# Patient Record
Sex: FEMALE | Race: BLACK OR AFRICAN AMERICAN | NOT HISPANIC OR LATINO | Employment: UNEMPLOYED | URBAN - METROPOLITAN AREA
[De-identification: names, ages, dates, MRNs, and addresses within clinical notes are randomized per-mention and may not be internally consistent; named-entity substitution may affect disease eponyms.]

---

## 2017-03-14 ENCOUNTER — PATIENT MESSAGE (OUTPATIENT)
Dept: INTERNAL MEDICINE | Facility: CLINIC | Age: 42
End: 2017-03-14

## 2017-03-14 DIAGNOSIS — L81.9 DYSCHROMIA: ICD-10-CM

## 2017-03-14 DIAGNOSIS — F41.9 ANXIETY: ICD-10-CM

## 2017-03-14 DIAGNOSIS — L70.0 ACNE VULGARIS: ICD-10-CM

## 2017-03-14 DIAGNOSIS — F32.A DEPRESSION, UNSPECIFIED DEPRESSION TYPE: ICD-10-CM

## 2017-03-14 RX ORDER — METFORMIN HYDROCHLORIDE 500 MG/1
500 TABLET, EXTENDED RELEASE ORAL DAILY
Qty: 90 TABLET | Refills: 0 | Status: SHIPPED | OUTPATIENT
Start: 2017-03-14 | End: 2017-03-20 | Stop reason: SDUPTHER

## 2017-03-14 RX ORDER — BUPROPION HYDROCHLORIDE 150 MG/1
TABLET ORAL
Qty: 90 TABLET | Refills: 1 | Status: CANCELLED | OUTPATIENT
Start: 2017-03-14

## 2017-03-14 RX ORDER — SERTRALINE HYDROCHLORIDE 50 MG/1
50 TABLET, FILM COATED ORAL DAILY
Qty: 90 TABLET | Refills: 1 | Status: CANCELLED | OUTPATIENT
Start: 2017-03-14 | End: 2018-03-14

## 2017-03-14 RX ORDER — REPAGLINIDE 2 MG/1
2 TABLET ORAL
Qty: 270 TABLET | Refills: 0 | Status: SHIPPED | OUTPATIENT
Start: 2017-03-14 | End: 2017-05-08

## 2017-03-14 RX ORDER — ERGOCALCIFEROL 1.25 MG/1
50000 CAPSULE ORAL
Qty: 8 CAPSULE | Refills: 0 | Status: SHIPPED | OUTPATIENT
Start: 2017-03-16 | End: 2021-11-01

## 2017-03-14 NOTE — TELEPHONE ENCOUNTER
meds refilled but dm has been severely out of control. She cancelled dm clinic appt in feb and has not rescheduled per chart review.  If she still considers me as her primary care physician I strongly recommend that she reschedule with diabetes clinic for intensive diabetes control.

## 2017-03-15 ENCOUNTER — PATIENT MESSAGE (OUTPATIENT)
Dept: DERMATOLOGY | Facility: CLINIC | Age: 42
End: 2017-03-15

## 2017-03-20 ENCOUNTER — OFFICE VISIT (OUTPATIENT)
Dept: INTERNAL MEDICINE | Facility: CLINIC | Age: 42
End: 2017-03-20
Payer: COMMERCIAL

## 2017-03-20 ENCOUNTER — LAB VISIT (OUTPATIENT)
Dept: LAB | Facility: HOSPITAL | Age: 42
End: 2017-03-20
Attending: INTERNAL MEDICINE
Payer: COMMERCIAL

## 2017-03-20 VITALS
WEIGHT: 219.56 LBS | HEART RATE: 89 BPM | BODY MASS INDEX: 33.28 KG/M2 | OXYGEN SATURATION: 98 % | DIASTOLIC BLOOD PRESSURE: 86 MMHG | SYSTOLIC BLOOD PRESSURE: 118 MMHG | HEIGHT: 68 IN | TEMPERATURE: 98 F

## 2017-03-20 DIAGNOSIS — E66.9 OBESITY, UNSPECIFIED OBESITY SEVERITY, UNSPECIFIED OBESITY TYPE: ICD-10-CM

## 2017-03-20 DIAGNOSIS — F41.8 DEPRESSION WITH ANXIETY: ICD-10-CM

## 2017-03-20 DIAGNOSIS — K58.0 IRRITABLE BOWEL SYNDROME WITH DIARRHEA: Chronic | ICD-10-CM

## 2017-03-20 DIAGNOSIS — E55.9 VITAMIN D DEFICIENCY: ICD-10-CM

## 2017-03-20 DIAGNOSIS — F41.9 ANXIETY: ICD-10-CM

## 2017-03-20 LAB
CHOLEST/HDLC SERPL: 4.4 {RATIO}
HDL/CHOLESTEROL RATIO: 22.6 %
HDLC SERPL-MCNC: 186 MG/DL
HDLC SERPL-MCNC: 42 MG/DL
LDLC SERPL CALC-MCNC: 107.2 MG/DL
NONHDLC SERPL-MCNC: 144 MG/DL
TRIGL SERPL-MCNC: 184 MG/DL

## 2017-03-20 PROCEDURE — 99214 OFFICE O/P EST MOD 30 MIN: CPT | Mod: S$GLB,,, | Performed by: INTERNAL MEDICINE

## 2017-03-20 PROCEDURE — 36415 COLL VENOUS BLD VENIPUNCTURE: CPT | Mod: PO

## 2017-03-20 PROCEDURE — 83036 HEMOGLOBIN GLYCOSYLATED A1C: CPT

## 2017-03-20 PROCEDURE — 80061 LIPID PANEL: CPT

## 2017-03-20 PROCEDURE — 99999 PR PBB SHADOW E&M-EST. PATIENT-LVL IV: CPT | Mod: PBBFAC,,, | Performed by: INTERNAL MEDICINE

## 2017-03-20 PROCEDURE — 3060F POS MICROALBUMINURIA REV: CPT | Mod: S$GLB,,, | Performed by: INTERNAL MEDICINE

## 2017-03-20 PROCEDURE — 2022F DILAT RTA XM EVC RTNOPTHY: CPT | Mod: S$GLB,,, | Performed by: INTERNAL MEDICINE

## 2017-03-20 PROCEDURE — 3046F HEMOGLOBIN A1C LEVEL >9.0%: CPT | Mod: S$GLB,,, | Performed by: INTERNAL MEDICINE

## 2017-03-20 PROCEDURE — 1160F RVW MEDS BY RX/DR IN RCRD: CPT | Mod: S$GLB,,, | Performed by: INTERNAL MEDICINE

## 2017-03-20 RX ORDER — METFORMIN HYDROCHLORIDE 500 MG/1
500 TABLET, EXTENDED RELEASE ORAL 2 TIMES DAILY WITH MEALS
Qty: 90 TABLET | Refills: 0 | Status: SHIPPED | OUTPATIENT
Start: 2017-03-20 | End: 2017-05-10 | Stop reason: SDUPTHER

## 2017-03-20 RX ORDER — SERTRALINE HYDROCHLORIDE 50 MG/1
50 TABLET, FILM COATED ORAL DAILY
Qty: 90 TABLET | Refills: 1 | Status: SHIPPED | OUTPATIENT
Start: 2017-03-20 | End: 2018-02-27

## 2017-03-20 RX ORDER — BUPROPION HYDROCHLORIDE 150 MG/1
TABLET ORAL
Qty: 90 TABLET | Refills: 1 | Status: SHIPPED | OUTPATIENT
Start: 2017-03-20 | End: 2018-02-27

## 2017-03-20 RX ORDER — CHLORDIAZEPOXIDE HYDROCHLORIDE AND CLIDINIUM BROMIDE 5; 2.5 MG/1; MG/1
1 CAPSULE ORAL
Qty: 90 CAPSULE | Refills: 0 | Status: SHIPPED | OUTPATIENT
Start: 2017-03-20 | End: 2017-10-11 | Stop reason: SDUPTHER

## 2017-03-20 NOTE — PROGRESS NOTES
"Subjective:      Patient ID: Che Shay is a 41 y.o. female.    Chief Complaint: Follow-up    HPI Comments: 42 yo with Patient Active Problem List:     GERD (gastroesophageal reflux disease)     Depression with anxiety     Obesity     IBS (irritable bowel syndrome)     Vitamin D deficiency     Acne vulgaris     Onychomycosis of toenail     Uncontrolled diabetes mellitus    Here today for management of multiple medical problems present for years.  She reports intermittent compliance with her diet exercise and diabetes medications.  She has not been compliant with vitamin D supplementation.  She is a long history of irritable bowel syndrome for which Librax have improved has improved symptoms.  She requests refill for this today.  She reports that her depression and anxiety are doing well on her current regimen.    Review of Systems   Constitutional: Negative for chills and fever.   Respiratory: Negative for cough.    Cardiovascular: Negative for chest pain.   Gastrointestinal: Negative for abdominal pain.   Psychiatric/Behavioral: Negative for dysphoric mood, hallucinations and suicidal ideas. The patient is not nervous/anxious.      Objective:   /86 (BP Location: Right arm, Patient Position: Sitting)  Pulse 89  Temp 97.6 °F (36.4 °C) (Tympanic)   Ht 5' 8" (1.727 m)  Wt 99.6 kg (219 lb 9.3 oz)  LMP 03/15/2017 (Approximate)  SpO2 98%  BMI 33.39 kg/m2    Physical Exam   Constitutional: She is oriented to person, place, and time. She appears well-developed and well-nourished. No distress.   HENT:   Head: Normocephalic and atraumatic.   Mouth/Throat: Oropharynx is clear and moist.   Eyes: EOM are normal.   Neck: Neck supple.   Cardiovascular: Normal rate and regular rhythm.    Pulmonary/Chest: Breath sounds normal. She has no wheezes. She has no rales.   Neurological: She is alert and oriented to person, place, and time.   Skin: Skin is warm and dry.   Psychiatric: She has a normal mood and affect. Her " behavior is normal.       Assessment:     1. Uncontrolled type 2 diabetes mellitus without complication, with long-term current use of insulin    2. Vitamin D deficiency    3. Depression with anxiety    4. Anxiety    5. Obesity, unspecified obesity severity, unspecified obesity type    6. Irritable bowel syndrome with diarrhea      Plan:   Uncontrolled type 2 diabetes mellitus without complication, with long-term current use of insulin  -     metformin (GLUCOPHAGE-XR) 500 MG 24 hr tablet; Take 1 tablet (500 mg total) by mouth 2 (two) times daily with meals.  Dispense: 90 tablet; Refill: 0  -     Hemoglobin A1c; Future; Expected date: 3/20/17  -     Ambulatory referral to Optometry  -     Microalbumin/creatinine urine ratio; Future; Expected date: 3/20/17  -     Lipid panel; Future; Expected date: 3/20/17  -     Ambulatory Referral to Diabetes Education    Vitamin D deficiency  -     Vitamin D; Future; Expected date: 6/20/17    Depression with anxiety  Stable  Continue current medications    Anxiety  Stable continue current medications  -     buPROPion (WELLBUTRIN XL) 150 MG TB24 tablet; One tablet daily.  Dispense: 90 tablet; Refill: 1  -     sertraline (ZOLOFT) 50 MG tablet; Take 1 tablet (50 mg total) by mouth once daily.  Dispense: 90 tablet; Refill: 1    Obesity, unspecified obesity severity, unspecified obesity type  Diet and exercise discussed    Irritable bowel syndrome with diarrhea  -     chlordiazepoxide-clidinium 5-2.5 mg (LIBRAX) 5-2.5 mg Cap; Take 1 capsule by mouth 3 (three) times daily with meals. As needed  Dispense: 90 capsule; Refill: 0        Lab Frequency Next Occurrence   Mammo Digital Screening Bilat with CAD Once 8/8/2016   Hemoglobin A1c Once 4/7/2017   Hemoglobin A1c Once 3/20/2017   Vitamin D Once 6/20/2017   Microalbumin/creatinine urine ratio Once 3/20/2017   Lipid panel Once 3/20/2017         Return in about 3 months (around 6/20/2017), or if symptoms worsen or fail to improve.

## 2017-03-20 NOTE — MR AVS SNAPSHOT
[facility]  [address] [facility] [place] [address]  Phone: [phone]  Fax: [phone]                  Che Shay   3/20/2017 10:20 AM   Office Visit    Description:  Female : 1975   Provider:  Luis Alfredo Mckinney MD   Department:  [facility] - [facility]           Reason for Visit     Follow-up           Diagnoses this Visit        Comments    Uncontrolled type 2 diabetes mellitus without complication, with long-term current use of insulin    -  Primary     Vitamin D deficiency         Depression with anxiety         Anxiety         Obesity, unspecified obesity severity, unspecified obesity type         Onychomycosis of toenail         Irritable bowel syndrome with diarrhea                To Do List           Future Appointments        Provider Department Dept Phone    3/20/2017 12:15 PM LAB, SAME DAY [facility] [phone]    3/20/2017 12:40 PM SPECIMEN, [facility] [phone]    2017 7:30 AM Power Peraza Jr., LJ [facility] [phone]    2017 9:00 AM LABORATORY, [facility] [phone]    2017 11:20 AM Luis Alfredo Mckinney MD [facility] [phone]      Goals (5 Years of Data)     None      Follow-Up and Disposition     Return in about 3 months (around 2017), or if symptoms worsen or fail to improve.       These Medications        Disp Refills Start End    metformin (GLUCOPHAGE-XR) 500 MG 24 hr tablet 90 tablet 0 3/20/2017     Take 1 tablet (500 mg total) by mouth 2 (two) times daily with meals. - Oral    Pharmacy: [address] - [address] [facility] #: [phone]       buPROPion (WELLBUTRIN XL) 150 MG TB24 tablet 90 tablet 1 3/20/2017     One tablet daily.    Pharmacy: [address] [address] [facility] Ph #: [phone]       Notes to Pharmacy: Advise pt  last refill; needs appt w/PCP for future refills.    sertraline (ZOLOFT) 50 MG tablet 90 tablet 1 3/20/2017 3/20/2018    Take 1 tablet (50 mg total) by mouth once daily. - Oral    [facility] [address] [address] [facility] [phone]       Notes to Pharmacy: Advise pt last refill; needs appt w/PCP for future refills.    chlordiazepoxide-clidinium 5-2.5 mg (LIBRAX) 5-2.5 mg Cap 90 capsule 0 3/20/2017     Take 1 capsule by mouth 3 (three) times daily with meals. As needed - Oral    Pharmacy: [address] [address] [facility] Ph #: [phone]         [facility]     [facility] On Call Nurse Care Line - 24/7 Assistance  Registered nurses in the [facility] On Call Center provide clinical advisement, health education, appointment booking, and other advisory services.  Call for this free service at [phone].             Medications           CHANGE how you are taking these medications     Start Taking Instead of    metformin (GLUCOPHAGE-XR) 500 MG 24 hr tablet metformin (GLUCOPHAGE-XR) 500 MG 24 hr tablet    Dosage:  Take 1 tablet (500 mg total) by mouth 2 (two) times daily with meals. Dosage:  Take 1 tablet (500 mg total) by mouth once daily.    Reason for Change:  Reorder     chlordiazepoxide-clidinium 5-2.5 mg (LIBRAX) 5-2.5 mg Cap chlordiazepoxide-clidinium 5-2.5 mg (LIBRAX) 5-2.5 mg Cap  (Previously Discontinued)    Dosage:  Take 1 capsule by mouth 3 (three) times daily with meals. As needed Dosage:  TAKE ONE CAPSULE BY MOUTH THREE TIMES DAILY WITH MEALS,  LAST REFILL NEED TO MAKE APPOINTMENT WITH PRIMARY CARE PHYSICIAN    Reason for Change:  Patient no longer taking       STOP taking these medications     efinaconazole 10 % Susan Apply topically to fungal toenails daily    insulin aspart (NOVOLOG FLEXPEN) 100 unit/mL InPn pen Inject 8-16 Units into the skin 3 (three) times daily with meals. or as directed           Verify that the  "below list of medications is an accurate representation of the medications you are currently taking.  If none reported, the list may be blank. If incorrect, please contact your healthcare provider. Carry this list with you in case of emergency.           Current Medications     buPROPion (WELLBUTRIN XL) 150 MG TB24 tablet One tablet daily.    EPIDUO 0.1-2.5 % topical gel AAA face qhs    insulin glargine (LANTUS) 100 unit/mL injection Inject 40 Units into the skin every evening.    insulin syringe-needle U-100 0.3 mL 31 x 5/16" Syrg     metformin (GLUCOPHAGE-XR) 500 MG 24 hr tablet Take 1 tablet (500 mg total) by mouth 2 (two) times daily with meals.    pen needle, diabetic (BD INSULIN PEN NEEDLE UF SHORT) 31 gauge x 5/16" Ndle 1 Device by Misc.(Non-Drug; Combo Route) route once daily.    repaglinide (PRANDIN) 2 MG tablet Take 1 tablet (2 mg total) by mouth 3 (three) times daily before meals.    sertraline (ZOLOFT) 50 MG tablet Take 1 tablet (50 mg total) by mouth once daily.    triamcinolone acetonide 0.1% (KENALOG) 0.1 % cream Apply to affected areas twice a day as needed    blood sugar diagnostic (CONTOUR TEST STRIPS) Strp 1 strip by Misc.(Non-Drug; Combo Route) route 4 (four) times daily with meals and nightly.    chlordiazepoxide-clidinium 5-2.5 mg (LIBRAX) 5-2.5 mg Cap Take 1 capsule by mouth 3 (three) times daily with meals. As needed    ergocalciferol (ERGOCALCIFEROL) 50,000 unit Cap Take 1 capsule (50,000 Units total) by mouth twice a week.           Clinical Reference Information           Your Vitals Were     BP Pulse Temp Height Weight Last Period    118/86 (BP Location: Right arm, Patient Position: Sitting) 89 97.6 °F (36.4 °C) (Tympanic) 5' 8" (1.727 m) 99.6 kg (219 lb 9.3 oz) 03/15/2017 (Approximate)    SpO2 BMI             98% 33.39 kg/m2         Blood Pressure          Most Recent Value    BP  118/86      Allergies as of 3/20/2017     No Known Allergies      Immunizations Administered on Date of " Encounter - 3/20/2017     None      Orders Placed During Today's Visit      Normal Orders This Visit    Ambulatory Referral to Diabetes Education     Ambulatory referral to Optometry     Future Labs/Procedures Expected by Expires    Hemoglobin A1c  3/20/2017 3/20/2018    Lipid panel  3/20/2017 5/19/2018    Microalbumin/creatinine urine ratio  3/20/2017 (Approximate) 4/24/2018    Vitamin D  6/20/2017 (Approximate) 9/17/2017      Language Assistance Services     ATTENTION: Language assistance services are available, free of charge. Please call [phone].      ATENCIÓN: Si habla keena, tiene a méndez disposición servicios gratuitos de asistencia lingüística. Llame al [phone].     CHÚ Ý: N?u b?n nói Ti?ng Vi?t, có các d?ch v? h? tr? ngôn ng? mi?n phí dành cho b?n. G?i s? [phone].         [facility] complies with applicable Federal civil rights laws and does not discriminate on the basis of race, color, national origin, age, disability, or sex.

## 2017-03-21 LAB
ESTIMATED AVG GLUCOSE: 349 MG/DL
HBA1C MFR BLD HPLC: 13.8 %

## 2017-03-31 DIAGNOSIS — L70.0 ACNE VULGARIS: ICD-10-CM

## 2017-04-03 RX ORDER — CLINDAMYCIN PHOSPHATE AND BENZOYL PEROXIDE 10; 37.5 MG/G; MG/G
GEL TOPICAL
Qty: 50 G | Refills: 4 | Status: SHIPPED | OUTPATIENT
Start: 2017-04-03 | End: 2018-02-27

## 2017-04-25 ENCOUNTER — TELEPHONE (OUTPATIENT)
Dept: INTERNAL MEDICINE | Facility: CLINIC | Age: 42
End: 2017-04-25

## 2017-04-25 DIAGNOSIS — Z01.419 WELL WOMAN EXAM: Primary | ICD-10-CM

## 2017-04-25 NOTE — TELEPHONE ENCOUNTER
Pt stated she needs a gyn referral to Dr. Debora Rodriguez for a well woman exam.  Notified pt that request will be sent to doctor for approval.  Pt verbalized understanding.

## 2017-04-25 NOTE — TELEPHONE ENCOUNTER
----- Message from Hardeep Pelletier sent at 4/25/2017 11:19 AM CDT -----  Contact: pt  She's calling in regards to a referral to see dr memo mabry in ob/gyn, please advise, [phone] (home), please call pt when referral has been entered....

## 2017-05-08 ENCOUNTER — OFFICE VISIT (OUTPATIENT)
Dept: DIABETES | Facility: CLINIC | Age: 42
End: 2017-05-08
Payer: COMMERCIAL

## 2017-05-08 VITALS
WEIGHT: 214.75 LBS | DIASTOLIC BLOOD PRESSURE: 80 MMHG | BODY MASS INDEX: 32.55 KG/M2 | SYSTOLIC BLOOD PRESSURE: 120 MMHG | HEIGHT: 68 IN

## 2017-05-08 LAB — GLUCOSE SERPL-MCNC: 456 MG/DL (ref 70–110)

## 2017-05-08 PROCEDURE — 99214 OFFICE O/P EST MOD 30 MIN: CPT | Mod: S$GLB,,, | Performed by: PHYSICIAN ASSISTANT

## 2017-05-08 PROCEDURE — 99999 PR PBB SHADOW E&M-EST. PATIENT-LVL III: CPT | Mod: PBBFAC,,, | Performed by: PHYSICIAN ASSISTANT

## 2017-05-08 PROCEDURE — 1160F RVW MEDS BY RX/DR IN RCRD: CPT | Mod: S$GLB,,, | Performed by: PHYSICIAN ASSISTANT

## 2017-05-08 PROCEDURE — 3060F POS MICROALBUMINURIA REV: CPT | Mod: 8P,S$GLB,, | Performed by: PHYSICIAN ASSISTANT

## 2017-05-08 PROCEDURE — 3046F HEMOGLOBIN A1C LEVEL >9.0%: CPT | Mod: S$GLB,,, | Performed by: PHYSICIAN ASSISTANT

## 2017-05-08 PROCEDURE — 82948 REAGENT STRIP/BLOOD GLUCOSE: CPT | Mod: S$GLB,,, | Performed by: PHYSICIAN ASSISTANT

## 2017-05-08 RX ORDER — INSULIN ASPART 100 [IU]/ML
INJECTION, SOLUTION INTRAVENOUS; SUBCUTANEOUS
Qty: 54 ML | Refills: 3 | Status: SHIPPED | OUTPATIENT
Start: 2017-05-08 | End: 2021-11-01

## 2017-05-08 RX ORDER — VITAMIN E 268 MG
400 CAPSULE ORAL DAILY
COMMUNITY
End: 2021-11-01

## 2017-05-08 RX ORDER — ASCORBIC ACID 500 MG
500 TABLET ORAL DAILY
COMMUNITY
End: 2021-11-01

## 2017-05-08 RX ORDER — METFORMIN HYDROCHLORIDE 500 MG/1
TABLET, EXTENDED RELEASE ORAL
Qty: 90 TABLET | Refills: 0 | Status: SHIPPED | OUTPATIENT
Start: 2017-05-08 | End: 2017-07-10

## 2017-05-08 RX ORDER — MAGNESIUM 250 MG
1 TABLET ORAL
COMMUNITY
End: 2021-11-01

## 2017-05-08 NOTE — PROGRESS NOTES
Subjective:      Patient ID: Che Shay is a 41 y.o. female.    PCP: Luis Alfredo Mckinney MD      Che Shay is a pleasant 41 y.o. female presenting to follow up on diabetes mellitus. She has had diabetes for 10 or more years. Her last visit in Diabetes Management was 11/21/2016 Since that time she has had no improvement in her glycemia. Her blood sugar range fasting has been 200+ and 2 hour post meal has been 300+, and she has been monitoring 2-3 times per day as directed. Her current concerns are glycemic control.    She denies any hospital admissions, emergency room visits, hypoglycemia, syncope, diaphoresis, chest pain, or dyspnea.    She has lost 5 pounds since last visit. Her BMI is 32.65    Her blood sugar in the clinic today was:   Lab Results   Component Value Date    POCGLU 456 (A) 05/08/2017       We discussed the American diabetes Association recommendations:  hemoglobin A1c below 7.0%; all diabetics should be on statins unless contraindicated; one aspirin daily unless contraindicated; fasting blood sugar between 80 and 130 mg/dL; postprandial blood sugar below 180 mg/dl; prevention of hypoglycemia, may adjust goals to higher levels if persistent; ACE or ARB therapy if not contraindicated; and maintain in an ideal body weight with BMI below 25.    Che is compliant most of the time with DM medications.     Che is compliant most of the time with lifestyle modifications to include activity and meal planning.       STANDARDS OF CARE:  Eye doctor: unk, last exam unk.  Dental exam: Recommend regular exams; denies gums bleeding.  Podiatry doctor:     ACTIVITY LEVEL: She exercises rarely.  MEAL PLANNING: Number of meals per day - 3. Number of snacks per day - 2.  Per dietary recall, patient is not limiting carbohydrates, saturated fats and sodium.   BLOOD GLUCOSE TESTING: Self-monitoring with     The following results were reviewed with patient.    Lab Results   Component Value Date    WBC 8.30  03/29/2016    HGB 12.7 03/29/2016    HCT 39.5 03/29/2016     03/29/2016    CHOL 186 03/20/2017    TRIG 184 (H) 03/20/2017    HDL 42 03/20/2017    ALT 11 04/11/2016    AST 16 04/11/2016     03/29/2016    K 3.7 03/29/2016     03/29/2016    CREATININE 0.9 03/29/2016    ESTGFRAFRICA >60.0 03/29/2016    EGFRNONAA >60.0 03/29/2016    BUN 11 03/29/2016    CO2 24 03/29/2016    TSH 2.326 03/29/2016     (H) 03/29/2016       Lab Results   Component Value Date    HGBA1C 13.8 (H) 03/20/2017    HGBA1C 13.3 (H) 11/01/2016    HGBA1C 11.3 (H) 03/29/2016       Lab Results   Component Value Date    GLUTAMICACID 0.00 11/21/2016    CPEPTIDE 2.0 11/21/2016     Lab Results   Component Value Date    FREET4 1.35 10/21/2013     Lab Results   Component Value Date    TSH 2.326 03/29/2016     Lab Results   Component Value Date    CALCIUM 9.3 03/29/2016           Review of patient's allergies indicates:  No Known Allergies    Past Medical History:   Diagnosis Date    Depression with anxiety     GERD (gastroesophageal reflux disease)     IBS (irritable bowel syndrome)     Type II or unspecified type diabetes mellitus without mention of complication, uncontrolled     Vitamin D deficiency        Review of Systems   Constitutional: Negative.  Negative for activity change, appetite change, chills, diaphoresis, fatigue, fever and unexpected weight change.   HENT: Negative.  Negative for congestion, dental problem, drooling, ear discharge, ear pain, facial swelling, hearing loss, mouth sores, nosebleeds, postnasal drip, rhinorrhea, sinus pressure, sneezing, sore throat, tinnitus, trouble swallowing and voice change.    Eyes: Negative.  Negative for photophobia, pain, discharge, redness, itching and visual disturbance.   Respiratory: Negative.  Negative for apnea, cough, choking, chest tightness, shortness of breath, wheezing and stridor.    Cardiovascular: Negative.  Negative for chest pain, palpitations and leg swelling.  "  Gastrointestinal: Negative.  Negative for abdominal distention, abdominal pain, anal bleeding, blood in stool, constipation, diarrhea, nausea, rectal pain and vomiting.   Endocrine: Negative.  Negative for cold intolerance, heat intolerance, polydipsia, polyphagia and polyuria.   Genitourinary: Negative.  Negative for decreased urine volume, difficulty urinating, dyspareunia, dysuria, enuresis, flank pain, frequency, genital sores, hematuria, menstrual problem, pelvic pain, urgency, vaginal bleeding, vaginal discharge and vaginal pain.   Musculoskeletal: Negative.  Negative for arthralgias, back pain, gait problem, joint swelling, myalgias, neck pain and neck stiffness.   Skin: Negative.  Negative for color change, pallor, rash and wound.   Allergic/Immunologic: Negative.  Negative for environmental allergies, food allergies and immunocompromised state.   Neurological: Negative.  Negative for dizziness, tremors, seizures, syncope, facial asymmetry, speech difficulty, weakness, light-headedness, numbness and headaches.   Hematological: Negative.  Negative for adenopathy. Does not bruise/bleed easily.   Psychiatric/Behavioral: Negative.  Negative for agitation, behavioral problems, confusion, decreased concentration, dysphoric mood, hallucinations, self-injury, sleep disturbance and suicidal ideas. The patient is not nervous/anxious and is not hyperactive.       Objective:     Vitals - 1 value per visit 11/21/2016 3/20/2017 5/8/2017   SYSTOLIC 140 118 120   DIASTOLIC 82 86 80   PULSE - 89 -   TEMPERATURE - 97.6 -   SPO2 - 98 -   Weight (lb) 224.43 219.58 214.73   Weight (kg) 101.8 99.6 97.4   HEIGHT 5' 8" 5' 8" 5' 8"   BODY MASS INDEX 34.12 33.39 32.65   VISIT REPORT - - -   Pain Score  - 0 -       Physical Exam   Constitutional: She is oriented to person, place, and time. She appears well-developed and well-nourished. She is cooperative.  Non-toxic appearance. She does not have a sickly appearance. She does not " appear ill. No distress. She is not intubated.   HENT:   Head: Normocephalic and atraumatic. Not macrocephalic and not microcephalic. Head is without raccoon's eyes, without Valle's sign, without abrasion, without contusion, without laceration, without right periorbital erythema and without left periorbital erythema. Hair is normal.   Right Ear: Hearing, tympanic membrane, external ear and ear canal normal. No lacerations. No drainage, swelling or tenderness. No foreign bodies. No mastoid tenderness. Tympanic membrane is not injected, not scarred, not perforated, not erythematous, not retracted and not bulging. Tympanic membrane mobility is normal. No middle ear effusion. No hemotympanum. No decreased hearing is noted.   Left Ear: Hearing, tympanic membrane, external ear and ear canal normal. No lacerations. No drainage, swelling or tenderness. No foreign bodies. No mastoid tenderness. Tympanic membrane is not injected, not scarred, not perforated, not erythematous, not retracted and not bulging. Tympanic membrane mobility is normal.  No middle ear effusion. No hemotympanum. No decreased hearing is noted.   Nose: Nose normal. No mucosal edema, rhinorrhea, nose lacerations, sinus tenderness, nasal deformity, septal deviation or nasal septal hematoma. No epistaxis.  No foreign bodies. Right sinus exhibits no maxillary sinus tenderness and no frontal sinus tenderness. Left sinus exhibits no maxillary sinus tenderness and no frontal sinus tenderness.   Mouth/Throat: Oropharynx is clear and moist. No oropharyngeal exudate.   Eyes: Conjunctivae and EOM are normal. Pupils are equal, round, and reactive to light. Right eye exhibits no chemosis, no discharge and no exudate. No foreign body present in the right eye. Left eye exhibits no chemosis, no discharge, no exudate and no hordeolum. No foreign body present in the left eye. Right conjunctiva is not injected. Right conjunctiva has no hemorrhage. Left conjunctiva is not  injected. Left conjunctiva has no hemorrhage. No scleral icterus. Right eye exhibits normal extraocular motion and no nystagmus. Left eye exhibits normal extraocular motion and no nystagmus. Right pupil is round and reactive. Left pupil is round and reactive. Pupils are equal.   Fundoscopic exam:       The right eye shows no arteriolar narrowing, no AV nicking, no exudate, no hemorrhage and no papilledema. The right eye shows red reflex and venous pulsations.        The left eye shows no arteriolar narrowing, no AV nicking, no exudate, no hemorrhage and no papilledema. The left eye shows red reflex and venous pulsations.   Neck: Trachea normal, normal range of motion and full passive range of motion without pain. Neck supple. Normal carotid pulses, no hepatojugular reflux and no JVD present. No tracheal tenderness, no spinous process tenderness and no muscular tenderness present. Carotid bruit is not present. No rigidity. No tracheal deviation, no edema, no erythema and normal range of motion present. No thyroid mass and no thyromegaly present.   Cardiovascular: Normal rate, regular rhythm, normal heart sounds and intact distal pulses.   No extrasystoles are present. PMI is not displaced.  Exam reveals no gallop, no friction rub and no decreased pulses.    No murmur heard.  Pulses:       Dorsalis pedis pulses are 2+ on the right side, and 2+ on the left side.        Posterior tibial pulses are 2+ on the right side, and 2+ on the left side.   Pulmonary/Chest: Effort normal and breath sounds normal. No accessory muscle usage or stridor. No apnea, no tachypnea and no bradypnea. She is not intubated. No respiratory distress. She has no decreased breath sounds. She has no wheezes. She has no rhonchi. She has no rales. Chest wall is not dull to percussion. She exhibits no mass, no tenderness, no bony tenderness, no laceration, no crepitus, no edema, no deformity, no swelling and no retraction.   Abdominal: Soft. Normal  appearance and bowel sounds are normal. She exhibits no shifting dullness, no distension, no pulsatile liver, no fluid wave, no abdominal bruit, no ascites, no pulsatile midline mass and no mass. There is no hepatosplenomegaly, splenomegaly or hepatomegaly. There is no tenderness. There is no rigidity, no rebound, no guarding, no CVA tenderness, no tenderness at McBurney's point and negative Cagle's sign.   Musculoskeletal: Normal range of motion. She exhibits no edema or tenderness.        Right foot: There is normal range of motion and no deformity.        Left foot: There is normal range of motion and no deformity.   Feet:   Right Foot:   Protective Sensation: 5 sites tested. 5 sites sensed.   Skin Integrity: Negative for ulcer, blister, skin breakdown, erythema, warmth, callus or dry skin.   Left Foot:   Protective Sensation: 5 sites tested. 5 sites sensed.   Skin Integrity: Negative for ulcer, blister, skin breakdown, erythema, warmth, callus or dry skin.   Lymphadenopathy:        Head (right side): No submental, no submandibular, no tonsillar, no preauricular, no posterior auricular and no occipital adenopathy present.        Head (left side): No submental, no submandibular, no tonsillar, no preauricular, no posterior auricular and no occipital adenopathy present.     She has no cervical adenopathy.        Right cervical: No superficial cervical, no deep cervical and no posterior cervical adenopathy present.       Left cervical: No superficial cervical, no deep cervical and no posterior cervical adenopathy present.     She has no axillary adenopathy.   Neurological: She is alert and oriented to person, place, and time. She has normal reflexes. She is not disoriented. She displays no atrophy, no tremor and normal reflexes. No cranial nerve deficit or sensory deficit. She exhibits normal muscle tone. She displays no seizure activity. Coordination and gait normal.   Reflex Scores:       Bicep reflexes are 2+ on  the right side and 2+ on the left side.       Brachioradialis reflexes are 2+ on the right side and 2+ on the left side.       Patellar reflexes are 2+ on the right side and 2+ on the left side.  Skin: Skin is warm and dry. No abrasion, no bruising, no burn, no ecchymosis, no laceration, no lesion, no petechiae, no purpura and no rash noted. Rash is not macular, not papular, not maculopapular, not nodular, not pustular, not vesicular and not urticarial. She is not diaphoretic. No cyanosis or erythema. No pallor. Nails show no clubbing.   Psychiatric: She has a normal mood and affect. Her behavior is normal. Judgment and thought content normal. Her mood appears not anxious. Her affect is not angry, not blunt and not labile. Her speech is not rapid and/or pressured, not delayed, not tangential and not slurred. She is not agitated, not aggressive, not hyperactive, not slowed, not withdrawn, not actively hallucinating and not combative. Thought content is not paranoid and not delusional. Cognition and memory are not impaired. She does not express impulsivity or inappropriate judgment. She does not exhibit a depressed mood. She expresses no homicidal and no suicidal ideation. She expresses no suicidal plans and no homicidal plans. She is communicative. She exhibits normal recent memory and normal remote memory. She is attentive.   Nursing note and vitals reviewed.    Assessment:     1. Uncontrolled type 2 diabetes mellitus without complication, with long-term current use of insulin       Plan:     Che Shay is seen today for   1. Uncontrolled type 2 diabetes mellitus without complication, with long-term current use of insulin      We have discussed the etiology and treatment options associated with the diagnosis as well as alternatives. She has elected the following treatments.     Uncontrolled type 2 diabetes mellitus without complication, with long-term current use of insulin  -     POCT glucose  - Have made  "changes to patient's insulin regimen, will start treating to target and have patient titrate her dose of insulin to the desired goal.  -     insulin aspart (NOVOLOG) 100 unit/mL InPn pen; Titrate up to 20 units subcutaneously three times a day 10-15 min before meals as directed in after visit summary.  Dispense: 54 mL; Refill: 3      1.) Patient was instructed to monitor blood glucose twice daily, fasting, and 2 hour post meal; if on Multiple Daily Injections (MDI) she will need to have pre-meal blood glucose as well. Reminded to bring BG meter or record to each visit for review.  2.) Reviewed pathophysiology of type 2 diabetes, complications related to the disease, importance of annual dilated eye exam and self daily foot examination.  3.) Continue medications as prescribed MDI Lantus and Novolog. [facility] MyChart or Phone review in 1 week with BG records for adjustment of medication.  4.) Reviewed carb counting, portion control, importance of spacing meals throughout the day to prevent post prandial elevations. Recommended low saturated fat, low sodium diet to aid in control of hypertension and cholesterol.  5.) Discussed activity, benefits, methods, and precautions. Recommended patient start/continue some form of exercise and increase as tolerated to 60 minutes per day to facilitate weight loss and aid in control of BGs. Also reminded patient of WHO recommendation of 10,000 steps daily as a goal.   6.) A1C, TSH, Lipid Panel, CMP with eGFR and Micro/Creatinine per ADA protocol.  7.) Return to clinic in 6 weeks for follow up. Advised patient to call clinic with any questions or concerns.     I have reviewed your results and they are still quite high. I would like you to start "Treating to Target". The treatment will be Insulin and your target will be the Fasting and 2 hour post meal blood sugar. It will work in this manner;    1. Goal for Fasting blood sugar is  mg/dl. I realize that you will need time to " "adjust to the new levels and presently you may feel too low if you are too aggressive now. So go slow and aim to lower your blood sugar to below 200 then 150 then 100 over several months.    2. Goal for 2 hour post meal blood sugar is below 180 mg/dl, here the same rules apply as in #1.    3. You will check your fasting blood sugar daily, if not where we would like it to be over a 3 day period then that evening we will increase the Lantus dose by 5 units. Then repeat the process over the next 3 days. Remember this is a slow process and take our time getting to goal. But, each week should be better than prior weeks. Blood sugars below 70 are unacceptable and should raise a "RED FLAG" where we may have to reduce our dose of insulin.    4. You will check your post meal glucose daily as well. However, each day you will check a different meal, (ie. Monday-breakfast; Tuesday- lunch; Wednesday- supper, then repeat). If your post meal glucose is not where we would like, increase pre-meal insulin by 2 units next time. A word of CAUTION: mealtime insulin is dependant on the size and concentration of your meal content. If not consuming a large meal do not take large dose of insulin. Use the reasonable person rule.     5. If you have any questions please do not hesitate to call.    Intensive insulin Therapy with correction factor:    You are on Intensive insulin therapy with Basal and Bolus insulin. Lantus, Levamir or NPH is your Basal insulin and will help maintain your fasting and between meal sugar. Your fast acting or rescue insulin is either Humalog, Novalog or Regular insulin and will control your post meal sugar.     You will Take 45 units of Lantus at 9 pm each night. This will be adjusted up or down depending on your fasting blood sugar before breakfast.    Novolog will follow this pre meal schedule; Correction factor of "2 units per 50 mg/dl" and is based on 30-60 grams of carbohydrates per meal.    If blood sugar is " below 70 eat first then check your blood sugar 2 hours later and make correction.  If blood pre-meal sugar is  70 -150 take 10 units of Novolog;  If blood pre-meal sugar is 151-200 take +2 units of Novolog;  If blood pre-meal sugar is 201-250 take +4 units of Novolog;  If blood pre-meal sugar is 251-300 take +6 units of Novolog;  If blood pre-meal sugar is 301-350 take +8 units of Novolog;  If blood pre-meal sugar is 351-400+ take +10 units of Novolog;  Also increase water intake and call for appointment.      A total of 40 minutes was spent in face to face time, of which 50 % was spent in counseling patient on disease process, complications, treatment, and side effects of medications.    The patient was explained the above plan and given opportunity to ask questions.  She understands, chooses and consents to this plan and accepts all the risks, which include but are not limited to the risks mentioned above.   She understands the alternative of having no testing, interventions or treatments at this time. She left content and without further questions.

## 2017-05-08 NOTE — MR AVS SNAPSHOT
[facility] Diabetes Management  [address] [facility] [place] [address]  Phone: [phone]  Fax: [phone]                  Che Shay   2017 10:30 AM   Office Visit    Description:  Female : 1975   Provider:  Power Peraza Jr., PA-C   Department:  [facility] - Diabetes Management           Reason for Visit     Diabetes Mellitus           Diagnoses this Visit        Comments    Uncontrolled type 2 diabetes mellitus without complication, with long-term current use of insulin    -  Primary            To Do List           Future Appointments        Provider Department Dept Phone    2017 9:00 AM LABORATORY, [facility] [phone]    2017 4:30 PM Debora Rodriguez MD [place] - Obstetrics and Gynecology [phone]    2017 11:20 AM Luis Alfredo Mckinney MD [facility] Internal Medicine [phone]      Goals (5 Years of Data)     None       These Medications        Disp Refills Start End    insulin aspart (NOVOLOG) 100 unit/mL InPn pen 54 mL 3 2017     Titrate up to 20 units subcutaneously three times a day 10-15 min before meals as directed in after visit summary.    Pharmacy: [address] [place], [place] - [address] [address]  Ph #: [phone]         [facility]     [facility] -  Assistance  Unless otherwise directed by your provider, please contact [facility], our nurse care line that is available for  assistance.     Registered nurses in the [facility] provide: appointment scheduling, clinical advisement, health education, and other advisory services.  Call: [phone] (toll free)               Medications           START taking these NEW medications        Refills    insulin aspart (NOVOLOG) 100 unit/mL InPn pen 3    Sig: Titrate up to 20 units subcutaneously three times a day 10-15 min before meals as directed in after visit summary.    Class: Normal      STOP taking these  "medications     repaglinide (PRANDIN) 2 MG tablet Take 1 tablet (2 mg total) by mouth 3 (three) times daily before meals.           Verify that the below list of medications is an accurate representation of the medications you are currently taking.  If none reported, the list may be blank. If incorrect, please contact your healthcare provider. Carry this list with you in case of emergency.           Current Medications     ascorbic acid, vitamin C, (VITAMIN C) 500 MG tablet Take 500 mg by mouth once daily.    blood sugar diagnostic (CONTOUR TEST STRIPS) Strp 1 strip by Misc.(Non-Drug; Combo Route) route 4 (four) times daily with meals and nightly.    buPROPion (WELLBUTRIN XL) 150 MG TB24 tablet One tablet daily.    chlordiazepoxide-clidinium 5-2.5 mg (LIBRAX) 5-2.5 mg Cap Take 1 capsule by mouth 3 (three) times daily with meals. As needed    CINNAMON BARK (CINNAMON ORAL) Take 1,000 mg by mouth.    EPIDUO 0.1-2.5 % topical gel AAA face qhs    ergocalciferol (ERGOCALCIFEROL) 50,000 unit Cap Take 1 capsule (50,000 Units total) by mouth twice a week.    insulin syringe-needle U-100 0.3 mL 31 x 5/16" Syrg     magnesium 250 mg Tab Take 1 tablet by mouth.    metformin (GLUCOPHAGE-XR) 500 MG 24 hr tablet Take 1 tablet (500 mg total) by mouth 2 (two) times daily with meals.    ONEXTON 1.2 %(1 % base) -3.75 % GlwP APPLY TOPICALLY ONCE DAILY    pen needle, diabetic (BD INSULIN PEN NEEDLE UF SHORT) 31 gauge x 5/16" Ndle 1 Device by Misc.(Non-Drug; Combo Route) route once daily.    sertraline (ZOLOFT) 50 MG tablet Take 1 tablet (50 mg total) by mouth once daily.    triamcinolone acetonide 0.1% (KENALOG) 0.1 % cream Apply to affected areas twice a day as needed    VITAMIN A ORAL Take by mouth.    vitamin E 400 UNIT capsule Take 400 Units by mouth once daily.    insulin aspart (NOVOLOG) 100 unit/mL InPn pen Titrate up to 20 units subcutaneously three times a day 10-15 min before meals as directed in after visit summary.    insulin " "glargine (LANTUS) 100 unit/mL injection Inject 40 Units into the skin every evening.           Clinical Reference Information           Your Vitals Were     BP Height Weight BMI       120/80 (BP Location: Right arm, Patient Position: Sitting, BP Method: Manual) 5' 8" (1.727 m) 97.4 kg (214 lb 11.7 oz) 32.65 kg/m2       Blood Pressure          Most Recent Value    BP  120/80      Allergies as of 5/8/2017     No Known Allergies      Immunizations Administered on Date of Encounter - 5/8/2017     None      Orders Placed During Today's Visit      Normal Orders This Visit    POCT glucose          5/8/2017 10:41 AM - Virginia Pimentel LPN      Component Results     Component Value Flag Ref Range Units Status    POC Glucose 456 (A) 70 - 110 mg/dL Final            Instructions     I have reviewed your results and they are still quite high. I would like you to start "Treating to Target". The treatment will be Insulin and your target will be the Fasting and 2 hour post meal blood sugar. It will work in this manner;    1. Goal for Fasting blood sugar is  mg/dl. I realize that you will need time to adjust to the new levels and presently you may feel too low if you are too aggressive now. So go slow and aim to lower your blood sugar to below 200 then 150 then 100 over several months.    2. Goal for 2 hour post meal blood sugar is below 180 mg/dl, here the same rules apply as in #1.    3. You will check your fasting blood sugar daily, if not where we would like it to be over a 3 day period then that evening we will increase the Lantus dose by 5 units. Then repeat the process over the next 3 days. Remember this is a slow process and take our time getting to goal. But, each week should be better than prior weeks. Blood sugars below 70 are unacceptable and should raise a "RED FLAG" where we may have to reduce our dose of insulin.    4. You will check your post meal glucose daily as well. However, each day you will check a " "different meal, (ie. Monday-breakfast; Tuesday- lunch; Wednesday- supper, then repeat). If your post meal glucose is not where we would like, increase pre-meal insulin by 2 units next time. A word of CAUTION: mealtime insulin is dependant on the size and concentration of your meal content. If not consuming a large meal do not take large dose of insulin. Use the reasonable person rule.     5. If you have any questions please do not hesitate to call.    Intensive insulin Therapy with correction factor:    You are on Intensive insulin therapy with Basal and Bolus insulin. Lantus, Levamir or NPH is your Basal insulin and will help maintain your fasting and between meal sugar. Your fast acting or rescue insulin is either Humalog, Novalog or Regular insulin and will control your post meal sugar.     You will Take 45 units of Lantus at 9 pm each night. This will be adjusted up or down depending on your fasting blood sugar before breakfast.    Novolog will follow this pre meal schedule; Correction factor of "2 units per 50 mg/dl" and is based on 30-60 grams of carbohydrates per meal.    If blood sugar is below 70 eat first then check your blood sugar 2 hours later and make correction.  If blood pre-meal sugar is  70 -150 take 10 units of Novolog;  If blood pre-meal sugar is 151-200 take +2 units of Novolog;  If blood pre-meal sugar is 201-250 take +4 units of Novolog;  If blood pre-meal sugar is 251-300 take +6 units of Novolog;  If blood pre-meal sugar is 301-350 take +8 units of Novolog;  If blood pre-meal sugar is 351-400+ take +10 units of Novolog;  Also increase water intake and call for appointment.         Language Assistance Services     ATTENTION: Language assistance services are available, free of charge. Please call [phone].      ATENCIÓN: Si habla keena, tiene a méndez disposición servicios gratuitos de asistencia lingüística. Llame al [phone].     CHÚ Ý: N?u b?n nói Ti?ng Vi?t, có các d?ch v? h? tr? " janey hicks? mi?n phí dành cho b?n. G?i s? [phone].         [facility] complies with applicable Federal civil rights laws and does not discriminate on the basis of race, color, national origin, age, disability, or sex.

## 2017-05-08 NOTE — PATIENT INSTRUCTIONS
" I have reviewed your results and they are still quite high. I would like you to start "Treating to Target". The treatment will be Insulin and your target will be the Fasting and 2 hour post meal blood sugar. It will work in this manner;    1. Goal for Fasting blood sugar is  mg/dl. I realize that you will need time to adjust to the new levels and presently you may feel too low if you are too aggressive now. So go slow and aim to lower your blood sugar to below 200 then 150 then 100 over several months.    2. Goal for 2 hour post meal blood sugar is below 180 mg/dl, here the same rules apply as in #1.    3. You will check your fasting blood sugar daily, if not where we would like it to be over a 3 day period then that evening we will increase the Lantus dose by 5 units. Then repeat the process over the next 3 days. Remember this is a slow process and take our time getting to goal. But, each week should be better than prior weeks. Blood sugars below 70 are unacceptable and should raise a "RED FLAG" where we may have to reduce our dose of insulin.    4. You will check your post meal glucose daily as well. However, each day you will check a different meal, (ie. Monday-breakfast; Tuesday- lunch; Wednesday- supper, then repeat). If your post meal glucose is not where we would like, increase pre-meal insulin by 2 units next time. A word of CAUTION: mealtime insulin is dependant on the size and concentration of your meal content. If not consuming a large meal do not take large dose of insulin. Use the reasonable person rule.     5. If you have any questions please do not hesitate to call.    Intensive insulin Therapy with correction factor:    You are on Intensive insulin therapy with Basal and Bolus insulin. Lantus, Levamir or NPH is your Basal insulin and will help maintain your fasting and between meal sugar. Your fast acting or rescue insulin is either Humalog, Novalog or Regular insulin and will control your " "post meal sugar.     You will Take 45 units of Lantus at 9 pm each night. This will be adjusted up or down depending on your fasting blood sugar before breakfast.    Novolog will follow this pre meal schedule; Correction factor of "2 units per 50 mg/dl" and is based on 30-60 grams of carbohydrates per meal.    If blood sugar is below 70 eat first then check your blood sugar 2 hours later and make correction.  If blood pre-meal sugar is  70 -150 take 10 units of Novolog;  If blood pre-meal sugar is 151-200 take +2 units of Novolog;  If blood pre-meal sugar is 201-250 take +4 units of Novolog;  If blood pre-meal sugar is 251-300 take +6 units of Novolog;  If blood pre-meal sugar is 301-350 take +8 units of Novolog;  If blood pre-meal sugar is 351-400+ take +10 units of Novolog;  Also increase water intake and call for appointment.    "

## 2017-05-09 RX ORDER — PEN NEEDLE, DIABETIC 30 GX3/16"
1 NEEDLE, DISPOSABLE MISCELLANEOUS 4 TIMES DAILY
Qty: 150 EACH | Refills: 11 | Status: SHIPPED | OUTPATIENT
Start: 2017-05-09 | End: 2021-11-01

## 2017-05-10 RX ORDER — METFORMIN HYDROCHLORIDE 500 MG/1
500 TABLET, EXTENDED RELEASE ORAL 2 TIMES DAILY WITH MEALS
Qty: 180 TABLET | Refills: 0 | Status: SHIPPED | OUTPATIENT
Start: 2017-05-10 | End: 2021-11-01

## 2017-05-10 NOTE — TELEPHONE ENCOUNTER
Pt needs script of metformin  mg bid sent to [facility] pharmacy.  Script for once daily was sent in error on 5/8/17 because the wrong dose was requested by pharmacy.

## 2017-05-10 NOTE — TELEPHONE ENCOUNTER
Notified pt that I have spoke with the pharmacist at [facility] and we are getting metformin XR bid refilled.  Pt verbalized understanding.

## 2017-05-10 NOTE — TELEPHONE ENCOUNTER
----- Message from Alicja Coon sent at 5/10/2017  2:57 PM CDT -----  Patient would like for you to call her at [phone].  This is all she would tell me.                                                   bell

## 2017-06-06 ENCOUNTER — PATIENT OUTREACH (OUTPATIENT)
Dept: ADMINISTRATIVE | Facility: HOSPITAL | Age: 42
End: 2017-06-06

## 2017-07-10 ENCOUNTER — OFFICE VISIT (OUTPATIENT)
Dept: OBSTETRICS AND GYNECOLOGY | Facility: CLINIC | Age: 42
End: 2017-07-10
Payer: COMMERCIAL

## 2017-07-10 ENCOUNTER — LAB VISIT (OUTPATIENT)
Dept: LAB | Facility: HOSPITAL | Age: 42
End: 2017-07-10
Attending: OBSTETRICS & GYNECOLOGY
Payer: COMMERCIAL

## 2017-07-10 VITALS
WEIGHT: 225.75 LBS | HEIGHT: 68 IN | DIASTOLIC BLOOD PRESSURE: 82 MMHG | SYSTOLIC BLOOD PRESSURE: 132 MMHG | BODY MASS INDEX: 34.21 KG/M2

## 2017-07-10 DIAGNOSIS — Z01.419 ENCOUNTER FOR GYNECOLOGICAL EXAMINATION (GENERAL) (ROUTINE) WITHOUT ABNORMAL FINDINGS: Primary | ICD-10-CM

## 2017-07-10 DIAGNOSIS — Z12.31 SCREENING MAMMOGRAM, ENCOUNTER FOR: ICD-10-CM

## 2017-07-10 DIAGNOSIS — Z72.51 HIGH RISK HETEROSEXUAL BEHAVIOR: ICD-10-CM

## 2017-07-10 DIAGNOSIS — Z11.3 SCREENING FOR GONORRHEA: ICD-10-CM

## 2017-07-10 DIAGNOSIS — Z12.4 SCREENING FOR CERVICAL CANCER: ICD-10-CM

## 2017-07-10 DIAGNOSIS — B96.89 BACTERIAL VAGINOSIS: ICD-10-CM

## 2017-07-10 DIAGNOSIS — N76.0 BACTERIAL VAGINOSIS: ICD-10-CM

## 2017-07-10 PROCEDURE — 86695 HERPES SIMPLEX TYPE 1 TEST: CPT

## 2017-07-10 PROCEDURE — 86592 SYPHILIS TEST NON-TREP QUAL: CPT

## 2017-07-10 PROCEDURE — 99999 PR PBB SHADOW E&M-EST. PATIENT-LVL III: CPT | Mod: PBBFAC,,, | Performed by: OBSTETRICS & GYNECOLOGY

## 2017-07-10 PROCEDURE — 99396 PREV VISIT EST AGE 40-64: CPT | Mod: S$GLB,,, | Performed by: OBSTETRICS & GYNECOLOGY

## 2017-07-10 PROCEDURE — 88175 CYTOPATH C/V AUTO FLUID REDO: CPT

## 2017-07-10 PROCEDURE — 87480 CANDIDA DNA DIR PROBE: CPT

## 2017-07-10 PROCEDURE — 36415 COLL VENOUS BLD VENIPUNCTURE: CPT | Mod: PO

## 2017-07-10 PROCEDURE — 86703 HIV-1/HIV-2 1 RESULT ANTBDY: CPT

## 2017-07-10 PROCEDURE — 87660 TRICHOMONAS VAGIN DIR PROBE: CPT

## 2017-07-10 PROCEDURE — 87591 N.GONORRHOEAE DNA AMP PROB: CPT

## 2017-07-10 NOTE — PROGRESS NOTES
Subjective:       Patient ID: Che Shay is a 42 y.o. female.    Chief Complaint:  Well Woman      History of Present Illness  HPI  Annual Exam-Premenopausal  Patient presents for annual exam. The patient has no complaints today. The patient is sexually active--no contraception. GYN screening history: last pap: approximate date  and was normal and last mammogram: approximate date has never had and was normal. The patient wears seatbelts: yes. The patient participates in regular exercise: yes--cycling and treadmill. Has the patient ever been transfused or tattooed?: yes. The patient reports that there is not domestic violence in her life.      Menses monthly, flow 6 days, using super pad; change q 4-6 hrs, min dysmenorrhea        GYN & OB HistoryPatient's last menstrual period was 2017 (approximate).   Date of Last Pap: No result found    OB History    Para Term  AB Living   2 2       2   SAB TAB Ectopic Multiple Live Births                  # Outcome Date GA Lbr Eugene/2nd Weight Sex Delivery Anes PTL Lv   2 Para            1 Para                   Review of Systems  Review of Systems   Constitutional: Negative for activity change, appetite change, chills, diaphoresis, fatigue, fever and unexpected weight change.   HENT: Negative for mouth sores and tinnitus.    Eyes: Negative for discharge and visual disturbance.   Respiratory: Negative for cough, shortness of breath and wheezing.    Cardiovascular: Negative for chest pain, palpitations and leg swelling.   Gastrointestinal: Negative for abdominal pain, bloating, blood in stool, constipation, diarrhea, nausea and vomiting.   Endocrine: Negative for diabetes, hair loss, hot flashes, hyperthyroidism and hypothyroidism.   Genitourinary: Negative for decreased libido, dyspareunia, dysuria, flank pain, frequency, genital sores, hematuria, menorrhagia, menstrual problem, pelvic pain, urgency, vaginal bleeding, vaginal discharge, vaginal pain,  dysmenorrhea, urinary incontinence, postcoital bleeding, postmenopausal bleeding and vaginal odor.   Musculoskeletal: Negative for back pain and myalgias.   Skin:  Negative for rash, no acne and hair changes.   Neurological: Negative for seizures, syncope, numbness and headaches.   Hematological: Negative for adenopathy. Does not bruise/bleed easily.   Psychiatric/Behavioral: Negative for depression and sleep disturbance. The patient is not nervous/anxious.    Breast: Negative for breast mass, breast pain, nipple discharge and skin changes          Objective:    Physical Exam:   Constitutional: She appears well-developed.     Eyes: Conjunctivae and EOM are normal. Pupils are equal, round, and reactive to light.    Neck: Normal range of motion. Neck supple.     Pulmonary/Chest: Effort normal. Right breast exhibits no mass, no nipple discharge, no skin change and no tenderness. Left breast exhibits no mass, no nipple discharge, no skin change and no tenderness. Breasts are symmetrical.        Abdominal: Soft.     Genitourinary: Rectum normal and uterus normal. Pelvic exam was performed with patient supine. Cervix is normal. Right adnexum displays no mass and no tenderness. Left adnexum displays no mass and no tenderness. No erythema, bleeding, rectocele, cystocele or unspecified prolapse of vaginal walls in the vagina. Vaginal discharge found. Labial bartholins normal.       Uterus Size: 6 cm   Musculoskeletal: Normal range of motion.       Neurological: She is alert.    Skin: Skin is warm.    Psychiatric: She has a normal mood and affect.          Assessment:        1. Encounter for gynecological examination (general) (routine) without abnormal findings    2. Screening mammogram, encounter for    3. Screening for cervical cancer    4. Screening for gonorrhea    5. Bacterial vaginosis    6. High risk heterosexual behavior               Plan:      Continue annual well woman exam.  Pap today  Gc/ct/affirm  today  Hiv/rpr, herpes testing per pt request  Continue diet, exercise, weight loss

## 2017-07-11 ENCOUNTER — PATIENT MESSAGE (OUTPATIENT)
Dept: OBSTETRICS AND GYNECOLOGY | Facility: CLINIC | Age: 42
End: 2017-07-11

## 2017-07-11 DIAGNOSIS — B37.31 YEAST VAGINITIS: Primary | ICD-10-CM

## 2017-07-11 LAB
C TRACH DNA SPEC QL NAA+PROBE: NOT DETECTED
CANDIDA RRNA VAG QL PROBE: POSITIVE
G VAGINALIS RRNA GENITAL QL PROBE: NEGATIVE
HIV 1+2 AB+HIV1 P24 AG SERPL QL IA: NEGATIVE
HSV1 IGG SERPL QL IA: NEGATIVE
HSV2 IGG SERPL QL IA: NEGATIVE
N GONORRHOEA DNA SPEC QL NAA+PROBE: NOT DETECTED
RPR SER QL: NORMAL
T VAGINALIS RRNA GENITAL QL PROBE: NEGATIVE

## 2017-07-11 RX ORDER — FLUCONAZOLE 200 MG/1
200 TABLET ORAL DAILY
Qty: 3 TABLET | Refills: 0 | Status: SHIPPED | OUTPATIENT
Start: 2017-07-11 | End: 2021-12-02 | Stop reason: SDUPTHER

## 2017-07-12 ENCOUNTER — PATIENT MESSAGE (OUTPATIENT)
Dept: OBSTETRICS AND GYNECOLOGY | Facility: CLINIC | Age: 42
End: 2017-07-12

## 2017-07-14 ENCOUNTER — PATIENT MESSAGE (OUTPATIENT)
Dept: OBSTETRICS AND GYNECOLOGY | Facility: CLINIC | Age: 42
End: 2017-07-14

## 2017-07-14 ENCOUNTER — TELEPHONE (OUTPATIENT)
Dept: OBSTETRICS AND GYNECOLOGY | Facility: CLINIC | Age: 42
End: 2017-07-14

## 2017-07-14 NOTE — TELEPHONE ENCOUNTER
Pt was upset that her medication was sent to the wrong pharmacy and I advised her that she has many pharmacy's in her chart so she has to inform the nurse which one it has to go to so this problem won't persist in the future. DS

## 2017-07-14 NOTE — TELEPHONE ENCOUNTER
----- Message from Kathy Cook sent at 7/14/2017 10:22 AM CDT -----  Contact: Patient   Patient returned call, Please call her at [phone]. Regards to her prescription.    Thanks  td

## 2017-07-14 NOTE — TELEPHONE ENCOUNTER
LM again for Pt to let her know the Rx she is inquiring about was sent to [facility]. [facility]

## 2017-07-17 ENCOUNTER — PATIENT MESSAGE (OUTPATIENT)
Dept: OBSTETRICS AND GYNECOLOGY | Facility: CLINIC | Age: 42
End: 2017-07-17

## 2017-08-29 ENCOUNTER — PATIENT MESSAGE (OUTPATIENT)
Dept: DIABETES | Facility: CLINIC | Age: 42
End: 2017-08-29

## 2017-08-29 ENCOUNTER — PATIENT MESSAGE (OUTPATIENT)
Dept: INTERNAL MEDICINE | Facility: CLINIC | Age: 42
End: 2017-08-29

## 2017-08-29 NOTE — TELEPHONE ENCOUNTER
This patient needs to resume follow-up with diabetes clinic and myself.  She needs to call 911 if she has any thoughts of hurting herself or anyone else or if she sees or hears things that aren't there.

## 2017-08-30 NOTE — TELEPHONE ENCOUNTER
Please schedule her to see me soon to evaluate her feet. She was supposed to have a 3 month f/u    Ory

## 2017-10-03 RX ORDER — INSULIN GLARGINE 100 [IU]/ML
INJECTION, SOLUTION SUBCUTANEOUS
Qty: 10 ML | Refills: 5 | Status: SHIPPED | OUTPATIENT
Start: 2017-10-03 | End: 2021-11-01

## 2017-10-10 ENCOUNTER — PATIENT OUTREACH (OUTPATIENT)
Dept: ADMINISTRATIVE | Facility: HOSPITAL | Age: 42
End: 2017-10-10

## 2017-10-11 DIAGNOSIS — K58.0 IRRITABLE BOWEL SYNDROME WITH DIARRHEA: Chronic | ICD-10-CM

## 2017-10-11 RX ORDER — CHLORDIAZEPOXIDE HYDROCHLORIDE AND CLIDINIUM BROMIDE 5; 2.5 MG/1; MG/1
1 CAPSULE ORAL
Qty: 90 CAPSULE | Refills: 0 | Status: SHIPPED | OUTPATIENT
Start: 2017-10-11 | End: 2018-02-27

## 2017-12-08 DIAGNOSIS — E11.9 TYPE 2 DIABETES MELLITUS WITHOUT COMPLICATION: ICD-10-CM

## 2018-02-20 ENCOUNTER — PATIENT OUTREACH (OUTPATIENT)
Dept: ADMINISTRATIVE | Facility: HOSPITAL | Age: 43
End: 2018-02-20

## 2018-02-20 NOTE — PROGRESS NOTES
Attempted to schedule diabetic eye exam and other health maintenance. Patient will drop off recent eye exam. Patient in agreement and vocalize understanding.

## 2018-02-27 ENCOUNTER — OFFICE VISIT (OUTPATIENT)
Dept: OBSTETRICS AND GYNECOLOGY | Facility: CLINIC | Age: 43
End: 2018-02-27
Payer: COMMERCIAL

## 2018-02-27 VITALS
DIASTOLIC BLOOD PRESSURE: 76 MMHG | BODY MASS INDEX: 32.31 KG/M2 | HEIGHT: 68 IN | WEIGHT: 213.19 LBS | SYSTOLIC BLOOD PRESSURE: 142 MMHG

## 2018-02-27 DIAGNOSIS — N76.3 CHRONIC VULVITIS: Primary | ICD-10-CM

## 2018-02-27 PROCEDURE — 99999 PR PBB SHADOW E&M-EST. PATIENT-LVL II: CPT | Mod: PBBFAC,,, | Performed by: OBSTETRICS & GYNECOLOGY

## 2018-02-27 PROCEDURE — 3008F BODY MASS INDEX DOCD: CPT | Mod: S$GLB,,, | Performed by: OBSTETRICS & GYNECOLOGY

## 2018-02-27 PROCEDURE — 99213 OFFICE O/P EST LOW 20 MIN: CPT | Mod: S$GLB,,, | Performed by: OBSTETRICS & GYNECOLOGY

## 2018-02-27 RX ORDER — SPIRONOLACTONE 100 MG/1
TABLET, FILM COATED ORAL
COMMUNITY
Start: 2018-01-29 | End: 2021-11-01

## 2018-02-27 RX ORDER — LUBIPROSTONE 24 UG/1
CAPSULE, GELATIN COATED ORAL
COMMUNITY
Start: 2017-12-19 | End: 2021-11-01

## 2018-02-27 NOTE — PROGRESS NOTES
Subjective:       Patient ID: Che Shay is a 42 y.o. female.    Chief Complaint:  Vaginitis and STD CHECK (if covered by insurance)      History of Present Illness  HPI  Vulva irritation with no discharge  DM that is not well controled, discussed importance of glucose control in preventing vaginitis   No lesions.   Advised that STD may not be covered.  Last screening 6 months ago, negative.     GYN & OB History  Patient's last menstrual period was 02/15/2018.   Date of Last Pap: 2017    OB History    Para Term  AB Living   2 2 2     2   SAB TAB Ectopic Multiple Live Births                  # Outcome Date GA Lbr Eugene/2nd Weight Sex Delivery Anes PTL Lv   2 Term      CS-Unspec      1 Term      CS-Unspec             Review of Systems  Review of Systems        Objective:    Physical Exam:               Genitourinary: Vagina normal. There is rash on the right labia. There is no tenderness or lesion on the right labia. There is rash on the left labia. There is no tenderness or lesion on the left labia. No erythema, tenderness or bleeding in the vagina. No foreign body in the vagina. No signs of injury around the vagina. No vaginal discharge found.   Genitourinary Comments: WET PREP  Normal findings                         Assessment:        1. Chronic vulvitis                Plan:      Che was seen today for vaginitis and std check.    Diagnoses and all orders for this visit:    Chronic vulvitis  Comments:  Use Kenalog cream that is prescribed for vulvitis

## 2018-09-20 ENCOUNTER — PATIENT OUTREACH (OUTPATIENT)
Dept: ADMINISTRATIVE | Facility: HOSPITAL | Age: 43
End: 2018-09-20

## 2018-11-05 ENCOUNTER — PATIENT OUTREACH (OUTPATIENT)
Dept: ADMINISTRATIVE | Facility: HOSPITAL | Age: 43
End: 2018-11-05

## 2018-11-05 NOTE — PROGRESS NOTES
I have attempted without success to contact  patient schedule an appt for DM Eye exam. Left voice mail.

## 2018-12-17 ENCOUNTER — PATIENT OUTREACH (OUTPATIENT)
Dept: ADMINISTRATIVE | Facility: HOSPITAL | Age: 43
End: 2018-12-17

## 2019-02-19 ENCOUNTER — PATIENT OUTREACH (OUTPATIENT)
Dept: ADMINISTRATIVE | Facility: HOSPITAL | Age: 44
End: 2019-02-19

## 2019-04-12 ENCOUNTER — PATIENT OUTREACH (OUTPATIENT)
Dept: ADMINISTRATIVE | Facility: HOSPITAL | Age: 44
End: 2019-04-12

## 2019-04-12 NOTE — PROGRESS NOTES
Attempted to contact patient regarding scheduling for physical/annual exam and or BP check   No answer. Left a detailed voicemail message including call back number.

## 2020-09-10 ENCOUNTER — OFFICE VISIT (OUTPATIENT)
Dept: OTOLARYNGOLOGY | Facility: CLINIC | Age: 45
End: 2020-09-10
Payer: COMMERCIAL

## 2020-09-10 VITALS
WEIGHT: 187.81 LBS | TEMPERATURE: 98 F | BODY MASS INDEX: 28.56 KG/M2 | SYSTOLIC BLOOD PRESSURE: 122 MMHG | DIASTOLIC BLOOD PRESSURE: 77 MMHG | HEART RATE: 95 BPM

## 2020-09-10 DIAGNOSIS — H60.11 CELLULITIS OF TRAGUS OF RIGHT EAR: Primary | ICD-10-CM

## 2020-09-10 PROCEDURE — 3008F BODY MASS INDEX DOCD: CPT | Mod: CPTII,S$GLB,, | Performed by: PHYSICIAN ASSISTANT

## 2020-09-10 PROCEDURE — 99999 PR PBB SHADOW E&M-EST. PATIENT-LVL V: CPT | Mod: PBBFAC,,, | Performed by: PHYSICIAN ASSISTANT

## 2020-09-10 PROCEDURE — 99204 OFFICE O/P NEW MOD 45 MIN: CPT | Mod: S$GLB,,, | Performed by: PHYSICIAN ASSISTANT

## 2020-09-10 PROCEDURE — 3008F PR BODY MASS INDEX (BMI) DOCUMENTED: ICD-10-PCS | Mod: CPTII,S$GLB,, | Performed by: PHYSICIAN ASSISTANT

## 2020-09-10 PROCEDURE — 99999 PR PBB SHADOW E&M-EST. PATIENT-LVL V: ICD-10-PCS | Mod: PBBFAC,,, | Performed by: PHYSICIAN ASSISTANT

## 2020-09-10 PROCEDURE — 99204 PR OFFICE/OUTPT VISIT, NEW, LEVL IV, 45-59 MIN: ICD-10-PCS | Mod: S$GLB,,, | Performed by: PHYSICIAN ASSISTANT

## 2020-09-10 RX ORDER — METRONIDAZOLE 500 MG/1
500 TABLET ORAL 2 TIMES DAILY
COMMUNITY
Start: 2020-08-22 | End: 2021-11-01

## 2020-09-10 RX ORDER — OMEPRAZOLE 40 MG/1
40 CAPSULE, DELAYED RELEASE ORAL DAILY
COMMUNITY
Start: 2020-08-18 | End: 2021-10-22 | Stop reason: SDUPTHER

## 2020-09-10 RX ORDER — SODIUM, POTASSIUM,MAG SULFATES 17.5-3.13G
SOLUTION, RECONSTITUTED, ORAL ORAL
COMMUNITY
Start: 2020-08-18 | End: 2021-10-22

## 2020-09-10 RX ORDER — VENLAFAXINE HYDROCHLORIDE 37.5 MG/1
37.5 CAPSULE, EXTENDED RELEASE ORAL
COMMUNITY
Start: 2020-04-09 | End: 2021-11-01

## 2020-09-10 RX ORDER — FLUCONAZOLE 150 MG/1
150 TABLET ORAL DAILY
COMMUNITY
Start: 2020-08-22 | End: 2021-10-05

## 2020-09-10 RX ORDER — CLINDAMYCIN HYDROCHLORIDE 300 MG/1
300 CAPSULE ORAL EVERY 8 HOURS
Qty: 30 CAPSULE | Refills: 0 | Status: SHIPPED | OUTPATIENT
Start: 2020-09-10 | End: 2020-09-20

## 2020-09-10 RX ORDER — TRAZODONE HYDROCHLORIDE 50 MG/1
50 TABLET ORAL NIGHTLY
COMMUNITY
Start: 2020-07-29 | End: 2021-11-01

## 2020-09-10 RX ORDER — NICOTINE POLACRILEX 2 MG
GUM BUCCAL
COMMUNITY
End: 2021-11-01

## 2020-09-10 RX ORDER — GINKGO BILOBA LEAF EXTRACT 60 MG
1300 CAPSULE ORAL
COMMUNITY
End: 2021-10-22

## 2020-09-10 RX ORDER — BROMPHENIRAMINE MALEATE, PSEUDOEPHEDRINE HYDROCHLORIDE, AND DEXTROMETHORPHAN HYDROBROMIDE 2; 30; 10 MG/5ML; MG/5ML; MG/5ML
SYRUP ORAL
COMMUNITY
Start: 2020-08-04 | End: 2021-11-01

## 2020-09-10 RX ORDER — GABAPENTIN 300 MG/1
300 CAPSULE ORAL 2 TIMES DAILY
COMMUNITY
Start: 2020-07-29 | End: 2021-11-01

## 2020-09-10 RX ORDER — MUPIROCIN 20 MG/G
OINTMENT TOPICAL 2 TIMES DAILY
Qty: 15 G | Refills: 3 | Status: SHIPPED | OUTPATIENT
Start: 2020-09-10 | End: 2020-09-20

## 2020-09-10 RX ORDER — AMOXICILLIN 500 MG
2 CAPSULE ORAL
COMMUNITY
End: 2021-11-01

## 2020-09-10 NOTE — PROGRESS NOTES
Subjective:       Patient ID: Che Shay is a 45 y.o. female.    Chief Complaint: Other (Infection from ear piercing)    Patient is a very pleasant 45 year old female here to see me today for the first time for evaluation of RIGHT tragal swelling.  She says she had right tragus pierced about 8 months ago.  She says it never fully healed and the piercing site stayed red and swollen.  She thought she might have sensitivity to the metal in the earring so she eventually removed it about 3 months ago.  Since removal, she has had increased swelling of the tragus.  She says she's able to express pus at times from the site of piercing on anterior tragus.  She was seen at [facility] last month and treated with Bactrim x 14 days.  She says it helped but did not fully resolve it.  She says it's down in size today.  At one point before Bactrim, she says the tragal swelling was so severe that it occluded her ear canal and caused her to have muffled hearing AD.  She has not had any fever.  She is diabetic and says her blood sugars have been running in the 300's.      Review of Systems   Constitutional: Negative for activity change, appetite change, fever and unexpected weight change.   HENT: Positive for ear pain (AD), facial swelling (RIGHT tragus) and hearing loss (AD when tragus swollen severely). Negative for nasal congestion, ear discharge, nosebleeds, rhinorrhea, sinus pressure/congestion, sore throat and tinnitus.    Eyes: Negative for discharge.   Respiratory: Negative for cough and shortness of breath.    Cardiovascular: Negative for chest pain.   Gastrointestinal: Positive for constipation and reflux. Negative for diarrhea.   Musculoskeletal: Negative for gait problem.   Allergic/Immunologic: Negative for food allergies.   Neurological: Negative for dizziness, light-headedness and headaches.   Hematological: Negative for adenopathy.   Psychiatric/Behavioral: Negative for confusion.         Objective:       Physical Exam  Vitals signs reviewed.   Constitutional:       General: She is not in acute distress.     Appearance: She is well-developed.   HENT:      Head: Normocephalic and atraumatic.      Right Ear: Tympanic membrane, ear canal and external ear normal. Swelling (right tragus with moderate edema and erythema with induration, no significant fluctuance, tiny scab on anterior tragus, no active drainage; unable to see TM due to edema) and tenderness present. No drainage.      Left Ear: Hearing, tympanic membrane, ear canal and external ear normal. No drainage, swelling or tenderness.      Ears:        Nose: Nose normal. No nasal deformity, mucosal edema or rhinorrhea.      Right Sinus: No maxillary sinus tenderness or frontal sinus tenderness.      Left Sinus: No maxillary sinus tenderness or frontal sinus tenderness.      Mouth/Throat:      Mouth: Mucous membranes are not pale and not dry.   Eyes:      General: Lids are normal. No scleral icterus.     Extraocular Movements:      Right eye: Normal extraocular motion and no nystagmus.      Left eye: Normal extraocular motion and no nystagmus.      Conjunctiva/sclera: Conjunctivae normal.      Right eye: Right conjunctiva is not injected. No chemosis.     Left eye: Left conjunctiva is not injected. No chemosis.     Pupils: Pupils are equal, round, and reactive to light.   Neck:      Thyroid: No thyroid mass or thyromegaly.      Trachea: Trachea and phonation normal. No tracheal tenderness or tracheal deviation.   Pulmonary:      Effort: Pulmonary effort is normal. No respiratory distress.      Breath sounds: No stridor.   Abdominal:      General: There is no distension.   Lymphadenopathy:      Head:      Right side of head: No submental, submandibular, preauricular or posterior auricular adenopathy.      Left side of head: No submental, submandibular, preauricular or posterior auricular adenopathy.      Cervical: No cervical adenopathy.   Skin:     General: Skin is  warm and dry.      Findings: No erythema or rash.   Neurological:      Mental Status: She is alert and oriented to person, place, and time.      Cranial Nerves: No cranial nerve deficit.   Psychiatric:         Behavior: Behavior normal. Behavior is cooperative.                 Assessment:       1. Cellulitis of tragus of right ear        Plan:         Recommend resuming oral antibiotics as well as starting topical Bactroban.  Will send in Clindamycin x 10 days.  Discussed with her that the tragus is quite indurated today with no significant fluctuance to attempt I&D.  I recommend applying moist heat BID and will schedule her to RTC on Monday for recheck with Taylor (Dr. Almazan will be in clinic at same time).  Discussed that depending on her repeat exam once on antibiotics, she may need imaging and/or incision and drainage.  Instructed her to call sooner with any worsening symptoms.  She voiced understanding.

## 2021-08-20 ENCOUNTER — TELEPHONE (OUTPATIENT)
Dept: DERMATOLOGY | Facility: CLINIC | Age: 46
End: 2021-08-20

## 2021-08-24 ENCOUNTER — TELEPHONE (OUTPATIENT)
Dept: DERMATOLOGY | Facility: CLINIC | Age: 46
End: 2021-08-24

## 2021-10-05 ENCOUNTER — OFFICE VISIT (OUTPATIENT)
Dept: PODIATRY | Facility: CLINIC | Age: 46
End: 2021-10-05
Payer: COMMERCIAL

## 2021-10-05 ENCOUNTER — PATIENT MESSAGE (OUTPATIENT)
Dept: DERMATOLOGY | Facility: CLINIC | Age: 46
End: 2021-10-05

## 2021-10-05 VITALS — HEIGHT: 68 IN | BODY MASS INDEX: 28.46 KG/M2 | WEIGHT: 187.81 LBS

## 2021-10-05 DIAGNOSIS — L74.513 HYPERHIDROSIS OF FEET: ICD-10-CM

## 2021-10-05 DIAGNOSIS — B35.1 ONYCHOMYCOSIS: ICD-10-CM

## 2021-10-05 DIAGNOSIS — M20.42 HAMMER TOE OF LEFT FOOT: ICD-10-CM

## 2021-10-05 DIAGNOSIS — B35.3 TINEA PEDIS OF BOTH FEET: Primary | ICD-10-CM

## 2021-10-05 PROCEDURE — 1160F PR REVIEW ALL MEDS BY PRESCRIBER/CLIN PHARMACIST DOCUMENTED: ICD-10-PCS | Mod: CPTII,S$GLB,, | Performed by: PODIATRIST

## 2021-10-05 PROCEDURE — 1159F PR MEDICATION LIST DOCUMENTED IN MEDICAL RECORD: ICD-10-PCS | Mod: CPTII,S$GLB,, | Performed by: PODIATRIST

## 2021-10-05 PROCEDURE — 3008F PR BODY MASS INDEX (BMI) DOCUMENTED: ICD-10-PCS | Mod: CPTII,S$GLB,, | Performed by: PODIATRIST

## 2021-10-05 PROCEDURE — 99999 PR PBB SHADOW E&M-EST. PATIENT-LVL IV: CPT | Mod: PBBFAC,,, | Performed by: PODIATRIST

## 2021-10-05 PROCEDURE — 99203 PR OFFICE/OUTPT VISIT, NEW, LEVL III, 30-44 MIN: ICD-10-PCS | Mod: S$GLB,,, | Performed by: PODIATRIST

## 2021-10-05 PROCEDURE — 99999 PR PBB SHADOW E&M-EST. PATIENT-LVL IV: ICD-10-PCS | Mod: PBBFAC,,, | Performed by: PODIATRIST

## 2021-10-05 PROCEDURE — 3008F BODY MASS INDEX DOCD: CPT | Mod: CPTII,S$GLB,, | Performed by: PODIATRIST

## 2021-10-05 PROCEDURE — 1159F MED LIST DOCD IN RCRD: CPT | Mod: CPTII,S$GLB,, | Performed by: PODIATRIST

## 2021-10-05 PROCEDURE — 99203 OFFICE O/P NEW LOW 30 MIN: CPT | Mod: S$GLB,,, | Performed by: PODIATRIST

## 2021-10-05 PROCEDURE — 1160F RVW MEDS BY RX/DR IN RCRD: CPT | Mod: CPTII,S$GLB,, | Performed by: PODIATRIST

## 2021-10-05 RX ORDER — GLYCOPYRROLATE 1 MG/1
1 TABLET ORAL 2 TIMES DAILY
Qty: 60 TABLET | Refills: 0 | Status: SHIPPED | OUTPATIENT
Start: 2021-10-05 | End: 2021-10-05

## 2021-10-05 RX ORDER — TERBINAFINE HYDROCHLORIDE 250 MG/1
250 TABLET ORAL DAILY
Qty: 30 TABLET | Refills: 0 | Status: SHIPPED | OUTPATIENT
Start: 2021-10-05 | End: 2021-11-01

## 2021-10-19 ENCOUNTER — OFFICE VISIT (OUTPATIENT)
Dept: PODIATRY | Facility: CLINIC | Age: 46
End: 2021-10-19
Payer: COMMERCIAL

## 2021-10-19 VITALS — BODY MASS INDEX: 28.46 KG/M2 | HEIGHT: 68 IN | WEIGHT: 187.81 LBS

## 2021-10-19 DIAGNOSIS — M79.674 PAIN OF RIGHT GREAT TOE: ICD-10-CM

## 2021-10-19 DIAGNOSIS — B35.1 ONYCHOMYCOSIS: ICD-10-CM

## 2021-10-19 DIAGNOSIS — M79.675 PAIN OF LEFT GREAT TOE: ICD-10-CM

## 2021-10-19 DIAGNOSIS — L60.2 ONYCHOGRYPHOSIS: Primary | ICD-10-CM

## 2021-10-19 PROCEDURE — 1159F MED LIST DOCD IN RCRD: CPT | Mod: CPTII,S$GLB,, | Performed by: PODIATRIST

## 2021-10-19 PROCEDURE — 1160F RVW MEDS BY RX/DR IN RCRD: CPT | Mod: CPTII,S$GLB,, | Performed by: PODIATRIST

## 2021-10-19 PROCEDURE — 99499 NO LOS: ICD-10-PCS | Mod: S$GLB,,, | Performed by: PODIATRIST

## 2021-10-19 PROCEDURE — 99999 PR PBB SHADOW E&M-EST. PATIENT-LVL II: ICD-10-PCS | Mod: PBBFAC,,, | Performed by: PODIATRIST

## 2021-10-19 PROCEDURE — 11730 AVULSION NAIL PLATE SIMPLE 1: CPT | Mod: T5,S$GLB,, | Performed by: PODIATRIST

## 2021-10-19 PROCEDURE — 99999 PR PBB SHADOW E&M-EST. PATIENT-LVL II: CPT | Mod: PBBFAC,,, | Performed by: PODIATRIST

## 2021-10-19 PROCEDURE — 3008F BODY MASS INDEX DOCD: CPT | Mod: CPTII,S$GLB,, | Performed by: PODIATRIST

## 2021-10-19 PROCEDURE — 11732 PR REMOVE, NAIL PLATE, EA ADDTL: ICD-10-PCS | Mod: TA,S$GLB,, | Performed by: PODIATRIST

## 2021-10-19 PROCEDURE — 1159F PR MEDICATION LIST DOCUMENTED IN MEDICAL RECORD: ICD-10-PCS | Mod: CPTII,S$GLB,, | Performed by: PODIATRIST

## 2021-10-19 PROCEDURE — 1160F PR REVIEW ALL MEDS BY PRESCRIBER/CLIN PHARMACIST DOCUMENTED: ICD-10-PCS | Mod: CPTII,S$GLB,, | Performed by: PODIATRIST

## 2021-10-19 PROCEDURE — 11732 AVLSN NAIL PLATE SIMPLE EACH: CPT | Mod: TA,S$GLB,, | Performed by: PODIATRIST

## 2021-10-19 PROCEDURE — 99499 UNLISTED E&M SERVICE: CPT | Mod: S$GLB,,, | Performed by: PODIATRIST

## 2021-10-19 PROCEDURE — 11730 PR REMOVAL OF NAIL PLATE: ICD-10-PCS | Mod: T5,S$GLB,, | Performed by: PODIATRIST

## 2021-10-19 PROCEDURE — 3008F PR BODY MASS INDEX (BMI) DOCUMENTED: ICD-10-PCS | Mod: CPTII,S$GLB,, | Performed by: PODIATRIST

## 2021-10-22 ENCOUNTER — OFFICE VISIT (OUTPATIENT)
Dept: GASTROENTEROLOGY | Facility: CLINIC | Age: 46
End: 2021-10-22
Payer: COMMERCIAL

## 2021-10-22 VITALS
SYSTOLIC BLOOD PRESSURE: 130 MMHG | DIASTOLIC BLOOD PRESSURE: 80 MMHG | HEIGHT: 68 IN | WEIGHT: 208.13 LBS | BODY MASS INDEX: 31.54 KG/M2

## 2021-10-22 DIAGNOSIS — R14.0 ABDOMINAL BLOATING: ICD-10-CM

## 2021-10-22 DIAGNOSIS — R10.13 EPIGASTRIC PAIN: Primary | ICD-10-CM

## 2021-10-22 DIAGNOSIS — K59.00 CONSTIPATION, UNSPECIFIED CONSTIPATION TYPE: ICD-10-CM

## 2021-10-22 DIAGNOSIS — K21.9 GASTROESOPHAGEAL REFLUX DISEASE, UNSPECIFIED WHETHER ESOPHAGITIS PRESENT: ICD-10-CM

## 2021-10-22 PROCEDURE — 3079F DIAST BP 80-89 MM HG: CPT | Mod: CPTII,S$GLB,, | Performed by: NURSE PRACTITIONER

## 2021-10-22 PROCEDURE — 99999 PR PBB SHADOW E&M-EST. PATIENT-LVL V: ICD-10-PCS | Mod: PBBFAC,,, | Performed by: NURSE PRACTITIONER

## 2021-10-22 PROCEDURE — 3075F PR MOST RECENT SYSTOLIC BLOOD PRESS GE 130-139MM HG: ICD-10-PCS | Mod: CPTII,S$GLB,, | Performed by: NURSE PRACTITIONER

## 2021-10-22 PROCEDURE — 3008F BODY MASS INDEX DOCD: CPT | Mod: CPTII,S$GLB,, | Performed by: NURSE PRACTITIONER

## 2021-10-22 PROCEDURE — 99999 PR PBB SHADOW E&M-EST. PATIENT-LVL V: CPT | Mod: PBBFAC,,, | Performed by: NURSE PRACTITIONER

## 2021-10-22 PROCEDURE — 3079F PR MOST RECENT DIASTOLIC BLOOD PRESSURE 80-89 MM HG: ICD-10-PCS | Mod: CPTII,S$GLB,, | Performed by: NURSE PRACTITIONER

## 2021-10-22 PROCEDURE — 1160F RVW MEDS BY RX/DR IN RCRD: CPT | Mod: CPTII,S$GLB,, | Performed by: NURSE PRACTITIONER

## 2021-10-22 PROCEDURE — 99204 OFFICE O/P NEW MOD 45 MIN: CPT | Mod: S$GLB,,, | Performed by: NURSE PRACTITIONER

## 2021-10-22 PROCEDURE — 1159F PR MEDICATION LIST DOCUMENTED IN MEDICAL RECORD: ICD-10-PCS | Mod: CPTII,S$GLB,, | Performed by: NURSE PRACTITIONER

## 2021-10-22 PROCEDURE — 1159F MED LIST DOCD IN RCRD: CPT | Mod: CPTII,S$GLB,, | Performed by: NURSE PRACTITIONER

## 2021-10-22 PROCEDURE — 99204 PR OFFICE/OUTPT VISIT, NEW, LEVL IV, 45-59 MIN: ICD-10-PCS | Mod: S$GLB,,, | Performed by: NURSE PRACTITIONER

## 2021-10-22 PROCEDURE — 3008F PR BODY MASS INDEX (BMI) DOCUMENTED: ICD-10-PCS | Mod: CPTII,S$GLB,, | Performed by: NURSE PRACTITIONER

## 2021-10-22 PROCEDURE — 3075F SYST BP GE 130 - 139MM HG: CPT | Mod: CPTII,S$GLB,, | Performed by: NURSE PRACTITIONER

## 2021-10-22 PROCEDURE — 1160F PR REVIEW ALL MEDS BY PRESCRIBER/CLIN PHARMACIST DOCUMENTED: ICD-10-PCS | Mod: CPTII,S$GLB,, | Performed by: NURSE PRACTITIONER

## 2021-10-22 RX ORDER — POLYETHYLENE GLYCOL 3350 17 G/17G
17 POWDER, FOR SOLUTION ORAL DAILY
Qty: 510 G | Refills: 5 | Status: SHIPPED | OUTPATIENT
Start: 2021-10-22 | End: 2021-11-01

## 2021-10-22 RX ORDER — OMEPRAZOLE 40 MG/1
40 CAPSULE, DELAYED RELEASE ORAL DAILY
Qty: 30 CAPSULE | Refills: 11 | Status: SHIPPED | OUTPATIENT
Start: 2021-10-22 | End: 2021-11-01

## 2021-10-27 ENCOUNTER — LAB VISIT (OUTPATIENT)
Dept: LAB | Facility: HOSPITAL | Age: 46
End: 2021-10-27
Payer: COMMERCIAL

## 2021-10-27 DIAGNOSIS — R14.0 ABDOMINAL BLOATING: ICD-10-CM

## 2021-10-27 PROCEDURE — 87338 HPYLORI STOOL AG IA: CPT | Performed by: NURSE PRACTITIONER

## 2021-11-01 ENCOUNTER — TELEPHONE (OUTPATIENT)
Dept: DIABETES | Facility: CLINIC | Age: 46
End: 2021-11-01
Payer: COMMERCIAL

## 2021-11-01 ENCOUNTER — TELEPHONE (OUTPATIENT)
Dept: INTERNAL MEDICINE | Facility: CLINIC | Age: 46
End: 2021-11-01
Payer: COMMERCIAL

## 2021-11-01 ENCOUNTER — OFFICE VISIT (OUTPATIENT)
Dept: INTERNAL MEDICINE | Facility: CLINIC | Age: 46
End: 2021-11-01
Payer: COMMERCIAL

## 2021-11-01 ENCOUNTER — IMMUNIZATION (OUTPATIENT)
Dept: PHARMACY | Facility: CLINIC | Age: 46
End: 2021-11-01
Payer: COMMERCIAL

## 2021-11-01 VITALS
WEIGHT: 214.75 LBS | HEART RATE: 86 BPM | TEMPERATURE: 99 F | DIASTOLIC BLOOD PRESSURE: 78 MMHG | HEIGHT: 68 IN | SYSTOLIC BLOOD PRESSURE: 114 MMHG | BODY MASS INDEX: 32.55 KG/M2 | OXYGEN SATURATION: 98 % | RESPIRATION RATE: 16 BRPM

## 2021-11-01 DIAGNOSIS — L65.9 HAIR LOSS: ICD-10-CM

## 2021-11-01 DIAGNOSIS — Z12.11 COLON CANCER SCREENING: ICD-10-CM

## 2021-11-01 DIAGNOSIS — Z00.00 ROUTINE GENERAL MEDICAL EXAMINATION AT A HEALTH CARE FACILITY: Primary | ICD-10-CM

## 2021-11-01 DIAGNOSIS — F41.8 DEPRESSION WITH ANXIETY: ICD-10-CM

## 2021-11-01 DIAGNOSIS — E11.65 UNCONTROLLED TYPE 2 DIABETES MELLITUS WITH HYPERGLYCEMIA: ICD-10-CM

## 2021-11-01 DIAGNOSIS — Z12.31 ENCOUNTER FOR SCREENING MAMMOGRAM FOR BREAST CANCER: ICD-10-CM

## 2021-11-01 DIAGNOSIS — Z23 NEED FOR DIPHTHERIA-TETANUS-PERTUSSIS (TDAP) VACCINE: ICD-10-CM

## 2021-11-01 PROCEDURE — 3078F DIAST BP <80 MM HG: CPT | Mod: CPTII,S$GLB,, | Performed by: INTERNAL MEDICINE

## 2021-11-01 PROCEDURE — 99396 PREV VISIT EST AGE 40-64: CPT | Mod: S$GLB,,, | Performed by: INTERNAL MEDICINE

## 2021-11-01 PROCEDURE — 1159F PR MEDICATION LIST DOCUMENTED IN MEDICAL RECORD: ICD-10-PCS | Mod: CPTII,S$GLB,, | Performed by: INTERNAL MEDICINE

## 2021-11-01 PROCEDURE — 3008F PR BODY MASS INDEX (BMI) DOCUMENTED: ICD-10-PCS | Mod: CPTII,S$GLB,, | Performed by: INTERNAL MEDICINE

## 2021-11-01 PROCEDURE — 3008F BODY MASS INDEX DOCD: CPT | Mod: CPTII,S$GLB,, | Performed by: INTERNAL MEDICINE

## 2021-11-01 PROCEDURE — 3074F SYST BP LT 130 MM HG: CPT | Mod: CPTII,S$GLB,, | Performed by: INTERNAL MEDICINE

## 2021-11-01 PROCEDURE — 1159F MED LIST DOCD IN RCRD: CPT | Mod: CPTII,S$GLB,, | Performed by: INTERNAL MEDICINE

## 2021-11-01 PROCEDURE — 3074F PR MOST RECENT SYSTOLIC BLOOD PRESSURE < 130 MM HG: ICD-10-PCS | Mod: CPTII,S$GLB,, | Performed by: INTERNAL MEDICINE

## 2021-11-01 PROCEDURE — 3052F HG A1C>EQUAL 8.0%<EQUAL 9.0%: CPT | Mod: CPTII,S$GLB,, | Performed by: INTERNAL MEDICINE

## 2021-11-01 PROCEDURE — 3052F PR MOST RECENT HEMOGLOBIN A1C LEVEL 8.0 - < 9.0%: ICD-10-PCS | Mod: CPTII,S$GLB,, | Performed by: INTERNAL MEDICINE

## 2021-11-01 PROCEDURE — 99396 PR PREVENTIVE VISIT,EST,40-64: ICD-10-PCS | Mod: S$GLB,,, | Performed by: INTERNAL MEDICINE

## 2021-11-01 PROCEDURE — 99999 PR PBB SHADOW E&M-EST. PATIENT-LVL V: ICD-10-PCS | Mod: PBBFAC,,, | Performed by: INTERNAL MEDICINE

## 2021-11-01 PROCEDURE — 99999 PR PBB SHADOW E&M-EST. PATIENT-LVL V: CPT | Mod: PBBFAC,,, | Performed by: INTERNAL MEDICINE

## 2021-11-01 PROCEDURE — 3078F PR MOST RECENT DIASTOLIC BLOOD PRESSURE < 80 MM HG: ICD-10-PCS | Mod: CPTII,S$GLB,, | Performed by: INTERNAL MEDICINE

## 2021-11-01 RX ORDER — INSULIN GLARGINE 100 [IU]/ML
10 INJECTION, SOLUTION SUBCUTANEOUS DAILY
Qty: 9 ML | Refills: 1 | Status: SHIPPED | OUTPATIENT
Start: 2021-11-01 | End: 2021-11-02 | Stop reason: SDUPTHER

## 2021-11-01 RX ORDER — GABAPENTIN 300 MG/1
300 CAPSULE ORAL NIGHTLY
Qty: 180 CAPSULE | Refills: 1 | Status: SHIPPED | OUTPATIENT
Start: 2021-11-01 | End: 2022-10-18 | Stop reason: SDUPTHER

## 2021-11-01 RX ORDER — FLUOXETINE 10 MG/1
10 CAPSULE ORAL DAILY
Qty: 30 CAPSULE | Refills: 11 | Status: SHIPPED | OUTPATIENT
Start: 2021-11-01 | End: 2022-10-18 | Stop reason: SDUPTHER

## 2021-11-02 ENCOUNTER — TELEPHONE (OUTPATIENT)
Dept: INTERNAL MEDICINE | Facility: CLINIC | Age: 46
End: 2021-11-02
Payer: COMMERCIAL

## 2021-11-02 DIAGNOSIS — E11.65 UNCONTROLLED TYPE 2 DIABETES MELLITUS WITH HYPERGLYCEMIA: ICD-10-CM

## 2021-11-02 RX ORDER — INSULIN GLARGINE 100 [IU]/ML
50 INJECTION, SOLUTION SUBCUTANEOUS DAILY
Qty: 45 ML | Refills: 0 | Status: SHIPPED | OUTPATIENT
Start: 2021-11-02 | End: 2021-12-30

## 2021-11-04 ENCOUNTER — TELEPHONE (OUTPATIENT)
Dept: GASTROENTEROLOGY | Facility: CLINIC | Age: 46
End: 2021-11-04
Payer: COMMERCIAL

## 2021-11-04 DIAGNOSIS — A04.8 H. PYLORI INFECTION: Primary | ICD-10-CM

## 2021-11-04 LAB
H PYLORI AG STL QL IA: ABNORMAL
SPECIMEN SOURCE: ABNORMAL

## 2021-11-04 RX ORDER — PANTOPRAZOLE SODIUM 40 MG/1
40 TABLET, DELAYED RELEASE ORAL 2 TIMES DAILY
Qty: 28 TABLET | Refills: 0 | Status: SHIPPED | OUTPATIENT
Start: 2021-11-04 | End: 2022-07-22

## 2021-11-04 RX ORDER — AMOXICILLIN 500 MG/1
1000 TABLET, FILM COATED ORAL 2 TIMES DAILY
Qty: 56 TABLET | Refills: 0 | Status: SHIPPED | OUTPATIENT
Start: 2021-11-04 | End: 2021-11-18

## 2021-11-04 RX ORDER — CLARITHROMYCIN 500 MG/1
500 TABLET, FILM COATED ORAL 2 TIMES DAILY
Qty: 28 TABLET | Refills: 0 | Status: SHIPPED | OUTPATIENT
Start: 2021-11-04 | End: 2021-11-18

## 2021-11-05 ENCOUNTER — PATIENT MESSAGE (OUTPATIENT)
Dept: GASTROENTEROLOGY | Facility: CLINIC | Age: 46
End: 2021-11-05
Payer: COMMERCIAL

## 2021-11-05 ENCOUNTER — LAB VISIT (OUTPATIENT)
Dept: LAB | Facility: HOSPITAL | Age: 46
End: 2021-11-05
Attending: INTERNAL MEDICINE
Payer: COMMERCIAL

## 2021-11-05 DIAGNOSIS — Z00.00 ROUTINE GENERAL MEDICAL EXAMINATION AT A HEALTH CARE FACILITY: ICD-10-CM

## 2021-11-05 LAB
25(OH)D3+25(OH)D2 SERPL-MCNC: 20 NG/ML (ref 30–96)
ALBUMIN SERPL BCP-MCNC: 3.1 G/DL (ref 3.5–5.2)
ALP SERPL-CCNC: 120 U/L (ref 55–135)
ALT SERPL W/O P-5'-P-CCNC: 20 U/L (ref 10–44)
ANION GAP SERPL CALC-SCNC: 10 MMOL/L (ref 8–16)
AST SERPL-CCNC: 16 U/L (ref 10–40)
BASOPHILS # BLD AUTO: 0.04 K/UL (ref 0–0.2)
BASOPHILS NFR BLD: 0.4 % (ref 0–1.9)
BILIRUB SERPL-MCNC: 0.3 MG/DL (ref 0.1–1)
BUN SERPL-MCNC: 11 MG/DL (ref 6–20)
CALCIUM SERPL-MCNC: 9.6 MG/DL (ref 8.7–10.5)
CHLORIDE SERPL-SCNC: 99 MMOL/L (ref 95–110)
CHOLEST SERPL-MCNC: 148 MG/DL (ref 120–199)
CHOLEST/HDLC SERPL: 3.2 {RATIO} (ref 2–5)
CO2 SERPL-SCNC: 24 MMOL/L (ref 23–29)
CREAT SERPL-MCNC: 0.8 MG/DL (ref 0.5–1.4)
DIFFERENTIAL METHOD: ABNORMAL
EOSINOPHIL # BLD AUTO: 0.2 K/UL (ref 0–0.5)
EOSINOPHIL NFR BLD: 2.2 % (ref 0–8)
ERYTHROCYTE [DISTWIDTH] IN BLOOD BY AUTOMATED COUNT: 13.8 % (ref 11.5–14.5)
EST. GFR  (AFRICAN AMERICAN): >60 ML/MIN/1.73 M^2
EST. GFR  (NON AFRICAN AMERICAN): >60 ML/MIN/1.73 M^2
ESTIMATED AVG GLUCOSE: 212 MG/DL (ref 68–131)
GLUCOSE SERPL-MCNC: 263 MG/DL (ref 70–110)
HBA1C MFR BLD: 9 % (ref 4–5.6)
HCT VFR BLD AUTO: 32.6 % (ref 37–48.5)
HDLC SERPL-MCNC: 46 MG/DL (ref 40–75)
HDLC SERPL: 31.1 % (ref 20–50)
HGB BLD-MCNC: 10.6 G/DL (ref 12–16)
IMM GRANULOCYTES # BLD AUTO: 0.04 K/UL (ref 0–0.04)
IMM GRANULOCYTES NFR BLD AUTO: 0.4 % (ref 0–0.5)
LDLC SERPL CALC-MCNC: 76.8 MG/DL (ref 63–159)
LYMPHOCYTES # BLD AUTO: 1.5 K/UL (ref 1–4.8)
LYMPHOCYTES NFR BLD: 16.5 % (ref 18–48)
MCH RBC QN AUTO: 29.1 PG (ref 27–31)
MCHC RBC AUTO-ENTMCNC: 32.5 G/DL (ref 32–36)
MCV RBC AUTO: 90 FL (ref 82–98)
MONOCYTES # BLD AUTO: 0.8 K/UL (ref 0.3–1)
MONOCYTES NFR BLD: 8.3 % (ref 4–15)
NEUTROPHILS # BLD AUTO: 6.7 K/UL (ref 1.8–7.7)
NEUTROPHILS NFR BLD: 72.2 % (ref 38–73)
NONHDLC SERPL-MCNC: 102 MG/DL
NRBC BLD-RTO: 0 /100 WBC
PLATELET # BLD AUTO: 203 K/UL (ref 150–450)
PMV BLD AUTO: 11.9 FL (ref 9.2–12.9)
POTASSIUM SERPL-SCNC: 4.3 MMOL/L (ref 3.5–5.1)
PROT SERPL-MCNC: 6.7 G/DL (ref 6–8.4)
RBC # BLD AUTO: 3.64 M/UL (ref 4–5.4)
SODIUM SERPL-SCNC: 133 MMOL/L (ref 136–145)
TRIGL SERPL-MCNC: 126 MG/DL (ref 30–150)
TSH SERPL DL<=0.005 MIU/L-ACNC: 1.85 UIU/ML (ref 0.4–4)
WBC # BLD AUTO: 9.35 K/UL (ref 3.9–12.7)

## 2021-11-05 PROCEDURE — 80061 LIPID PANEL: CPT | Performed by: INTERNAL MEDICINE

## 2021-11-05 PROCEDURE — 83036 HEMOGLOBIN GLYCOSYLATED A1C: CPT | Performed by: INTERNAL MEDICINE

## 2021-11-05 PROCEDURE — 85025 COMPLETE CBC W/AUTO DIFF WBC: CPT | Performed by: INTERNAL MEDICINE

## 2021-11-05 PROCEDURE — 82306 VITAMIN D 25 HYDROXY: CPT | Performed by: INTERNAL MEDICINE

## 2021-11-05 PROCEDURE — 80053 COMPREHEN METABOLIC PANEL: CPT | Performed by: INTERNAL MEDICINE

## 2021-11-05 PROCEDURE — 36415 COLL VENOUS BLD VENIPUNCTURE: CPT | Performed by: INTERNAL MEDICINE

## 2021-11-05 PROCEDURE — 84443 ASSAY THYROID STIM HORMONE: CPT | Performed by: INTERNAL MEDICINE

## 2021-11-05 PROCEDURE — 86803 HEPATITIS C AB TEST: CPT | Performed by: INTERNAL MEDICINE

## 2021-11-08 LAB — HCV AB SERPL QL IA: NEGATIVE

## 2021-11-09 ENCOUNTER — TELEPHONE (OUTPATIENT)
Dept: DIABETES | Facility: CLINIC | Age: 46
End: 2021-11-09
Payer: COMMERCIAL

## 2021-11-18 ENCOUNTER — TELEPHONE (OUTPATIENT)
Dept: ADMINISTRATIVE | Facility: HOSPITAL | Age: 46
End: 2021-11-18
Payer: COMMERCIAL

## 2021-12-02 ENCOUNTER — PATIENT MESSAGE (OUTPATIENT)
Dept: INTERNAL MEDICINE | Facility: CLINIC | Age: 46
End: 2021-12-02
Payer: COMMERCIAL

## 2021-12-02 ENCOUNTER — PATIENT MESSAGE (OUTPATIENT)
Dept: GASTROENTEROLOGY | Facility: CLINIC | Age: 46
End: 2021-12-02
Payer: COMMERCIAL

## 2021-12-02 DIAGNOSIS — B37.31 YEAST VAGINITIS: ICD-10-CM

## 2021-12-02 DIAGNOSIS — A04.8 H. PYLORI INFECTION: Primary | ICD-10-CM

## 2021-12-02 RX ORDER — FLUCONAZOLE 200 MG/1
200 TABLET ORAL DAILY
Qty: 3 TABLET | Refills: 1 | Status: SHIPPED | OUTPATIENT
Start: 2021-12-02 | End: 2021-12-05

## 2021-12-07 ENCOUNTER — TELEPHONE (OUTPATIENT)
Dept: ADMINISTRATIVE | Facility: HOSPITAL | Age: 46
End: 2021-12-07
Payer: COMMERCIAL

## 2021-12-09 LAB
LEFT EYE DM RETINOPATHY: POSITIVE
RIGHT EYE DM RETINOPATHY: POSITIVE

## 2021-12-29 ENCOUNTER — LAB VISIT (OUTPATIENT)
Dept: LAB | Facility: HOSPITAL | Age: 46
End: 2021-12-29
Attending: NURSE PRACTITIONER
Payer: COMMERCIAL

## 2021-12-29 DIAGNOSIS — A04.8 H. PYLORI INFECTION: ICD-10-CM

## 2021-12-29 PROCEDURE — 87338 HPYLORI STOOL AG IA: CPT | Performed by: NURSE PRACTITIONER

## 2021-12-30 ENCOUNTER — OFFICE VISIT (OUTPATIENT)
Dept: DIABETES | Facility: CLINIC | Age: 46
End: 2021-12-30
Payer: COMMERCIAL

## 2021-12-30 VITALS
SYSTOLIC BLOOD PRESSURE: 139 MMHG | WEIGHT: 223.13 LBS | HEIGHT: 68 IN | BODY MASS INDEX: 33.82 KG/M2 | DIASTOLIC BLOOD PRESSURE: 83 MMHG | HEART RATE: 90 BPM

## 2021-12-30 DIAGNOSIS — E11.42 DIABETIC PERIPHERAL NEUROPATHY ASSOCIATED WITH TYPE 2 DIABETES MELLITUS: ICD-10-CM

## 2021-12-30 DIAGNOSIS — Z79.4 UNCONTROLLED TYPE 2 DIABETES MELLITUS WITH HYPERGLYCEMIA, WITH LONG-TERM CURRENT USE OF INSULIN: Primary | ICD-10-CM

## 2021-12-30 DIAGNOSIS — E11.65 UNCONTROLLED TYPE 2 DIABETES MELLITUS WITH HYPERGLYCEMIA, WITH LONG-TERM CURRENT USE OF INSULIN: Primary | ICD-10-CM

## 2021-12-30 DIAGNOSIS — E11.3293 MILD NONPROLIFERATIVE DIABETIC RETINOPATHY OF BOTH EYES WITHOUT MACULAR EDEMA ASSOCIATED WITH TYPE 2 DIABETES MELLITUS: ICD-10-CM

## 2021-12-30 DIAGNOSIS — E55.9 VITAMIN D DEFICIENCY: ICD-10-CM

## 2021-12-30 LAB — GLUCOSE SERPL-MCNC: 291 MG/DL (ref 70–110)

## 2021-12-30 PROCEDURE — 99203 PR OFFICE/OUTPT VISIT, NEW, LEVL III, 30-44 MIN: ICD-10-PCS | Mod: S$GLB,,, | Performed by: NURSE PRACTITIONER

## 2021-12-30 PROCEDURE — 99203 OFFICE O/P NEW LOW 30 MIN: CPT | Mod: S$GLB,,, | Performed by: NURSE PRACTITIONER

## 2021-12-30 PROCEDURE — 82962 GLUCOSE BLOOD TEST: CPT | Mod: S$GLB,,, | Performed by: NURSE PRACTITIONER

## 2021-12-30 PROCEDURE — 99999 PR PBB SHADOW E&M-EST. PATIENT-LVL IV: ICD-10-PCS | Mod: PBBFAC,,, | Performed by: NURSE PRACTITIONER

## 2021-12-30 PROCEDURE — 82962 POCT GLUCOSE, HAND-HELD DEVICE: ICD-10-PCS | Mod: S$GLB,,, | Performed by: NURSE PRACTITIONER

## 2021-12-30 PROCEDURE — 99999 PR PBB SHADOW E&M-EST. PATIENT-LVL IV: CPT | Mod: PBBFAC,,, | Performed by: NURSE PRACTITIONER

## 2021-12-30 RX ORDER — TRAZODONE HYDROCHLORIDE 50 MG/1
TABLET ORAL
COMMUNITY
Start: 2021-11-29 | End: 2022-02-07 | Stop reason: SDUPTHER

## 2021-12-30 RX ORDER — DULAGLUTIDE 1.5 MG/.5ML
1.5 INJECTION, SOLUTION SUBCUTANEOUS
Qty: 4 PEN | Refills: 5 | Status: SHIPPED | OUTPATIENT
Start: 2021-12-30 | End: 2022-02-15 | Stop reason: SDUPTHER

## 2021-12-30 RX ORDER — PEN NEEDLE, DIABETIC 30 GX3/16"
NEEDLE, DISPOSABLE MISCELLANEOUS
Qty: 200 EACH | Refills: 5 | Status: SHIPPED | OUTPATIENT
Start: 2021-12-30 | End: 2022-10-26 | Stop reason: SDUPTHER

## 2021-12-30 RX ORDER — INSULIN PUMP SYRINGE, 3 ML
EACH MISCELLANEOUS
Qty: 1 EACH | Refills: 0 | Status: SHIPPED | OUTPATIENT
Start: 2021-12-30 | End: 2022-12-30

## 2021-12-30 RX ORDER — DULAGLUTIDE 0.75 MG/.5ML
0.75 INJECTION, SOLUTION SUBCUTANEOUS
Qty: 4 PEN | Refills: 0 | Status: SHIPPED | OUTPATIENT
Start: 2021-12-30 | End: 2021-12-30 | Stop reason: DRUGHIGH

## 2021-12-30 RX ORDER — INSULIN LISPRO-AABC 100 [IU]/ML
INJECTION, SOLUTION SUBCUTANEOUS
Qty: 5 PEN | Refills: 5 | Status: SHIPPED | OUTPATIENT
Start: 2021-12-30 | End: 2022-01-04 | Stop reason: SDUPTHER

## 2021-12-30 RX ORDER — METFORMIN HYDROCHLORIDE 500 MG/1
500 TABLET, EXTENDED RELEASE ORAL 2 TIMES DAILY WITH MEALS
Qty: 180 TABLET | Refills: 1 | Status: SHIPPED | OUTPATIENT
Start: 2021-12-30 | End: 2022-02-15 | Stop reason: ALTCHOICE

## 2021-12-30 RX ORDER — INSULIN GLARGINE 100 [IU]/ML
40 INJECTION, SOLUTION SUBCUTANEOUS DAILY
Qty: 15 ML | Refills: 5 | Status: SHIPPED | OUTPATIENT
Start: 2021-12-30 | End: 2022-02-15 | Stop reason: SDUPTHER

## 2021-12-30 RX ORDER — LANCETS
EACH MISCELLANEOUS
Qty: 100 EACH | Refills: 11 | Status: SHIPPED | OUTPATIENT
Start: 2021-12-30

## 2022-01-04 ENCOUNTER — PATIENT MESSAGE (OUTPATIENT)
Dept: DIABETES | Facility: CLINIC | Age: 47
End: 2022-01-04
Payer: COMMERCIAL

## 2022-01-04 DIAGNOSIS — Z79.4 UNCONTROLLED TYPE 2 DIABETES MELLITUS WITH HYPERGLYCEMIA, WITH LONG-TERM CURRENT USE OF INSULIN: ICD-10-CM

## 2022-01-04 DIAGNOSIS — E11.65 UNCONTROLLED TYPE 2 DIABETES MELLITUS WITH HYPERGLYCEMIA, WITH LONG-TERM CURRENT USE OF INSULIN: ICD-10-CM

## 2022-01-04 RX ORDER — INSULIN LISPRO-AABC 100 [IU]/ML
INJECTION, SOLUTION SUBCUTANEOUS
Qty: 5 PEN | Refills: 5 | Status: SHIPPED | OUTPATIENT
Start: 2022-01-04 | End: 2022-01-14

## 2022-01-06 LAB
H PYLORI AG STL QL IA: NORMAL
SPECIMEN SOURCE: NORMAL

## 2022-01-14 ENCOUNTER — PATIENT MESSAGE (OUTPATIENT)
Dept: DIABETES | Facility: CLINIC | Age: 47
End: 2022-01-14
Payer: COMMERCIAL

## 2022-01-14 DIAGNOSIS — Z79.4 UNCONTROLLED TYPE 2 DIABETES MELLITUS WITH HYPERGLYCEMIA, WITH LONG-TERM CURRENT USE OF INSULIN: Primary | ICD-10-CM

## 2022-01-14 DIAGNOSIS — E11.65 UNCONTROLLED TYPE 2 DIABETES MELLITUS WITH HYPERGLYCEMIA, WITH LONG-TERM CURRENT USE OF INSULIN: Primary | ICD-10-CM

## 2022-01-14 RX ORDER — INSULIN ASPART INJECTION 100 [IU]/ML
INJECTION, SOLUTION SUBCUTANEOUS
Qty: 5 PEN | Refills: 5 | Status: SHIPPED | OUTPATIENT
Start: 2022-01-14 | End: 2022-02-15

## 2022-02-07 ENCOUNTER — PATIENT MESSAGE (OUTPATIENT)
Dept: INTERNAL MEDICINE | Facility: CLINIC | Age: 47
End: 2022-02-07
Payer: COMMERCIAL

## 2022-02-15 ENCOUNTER — OFFICE VISIT (OUTPATIENT)
Dept: DIABETES | Facility: CLINIC | Age: 47
End: 2022-02-15
Payer: COMMERCIAL

## 2022-02-15 ENCOUNTER — LAB VISIT (OUTPATIENT)
Dept: LAB | Facility: HOSPITAL | Age: 47
End: 2022-02-15
Attending: NURSE PRACTITIONER
Payer: COMMERCIAL

## 2022-02-15 VITALS
WEIGHT: 219.13 LBS | BODY MASS INDEX: 33.32 KG/M2 | SYSTOLIC BLOOD PRESSURE: 122 MMHG | HEART RATE: 105 BPM | DIASTOLIC BLOOD PRESSURE: 80 MMHG

## 2022-02-15 DIAGNOSIS — Z79.4 UNCONTROLLED TYPE 2 DIABETES MELLITUS WITH HYPERGLYCEMIA, WITH LONG-TERM CURRENT USE OF INSULIN: Primary | ICD-10-CM

## 2022-02-15 DIAGNOSIS — E11.65 UNCONTROLLED TYPE 2 DIABETES MELLITUS WITH HYPERGLYCEMIA, WITH LONG-TERM CURRENT USE OF INSULIN: Primary | ICD-10-CM

## 2022-02-15 DIAGNOSIS — Z79.4 UNCONTROLLED TYPE 2 DIABETES MELLITUS WITH HYPERGLYCEMIA, WITH LONG-TERM CURRENT USE OF INSULIN: ICD-10-CM

## 2022-02-15 DIAGNOSIS — E11.3293 MILD NONPROLIFERATIVE DIABETIC RETINOPATHY OF BOTH EYES WITHOUT MACULAR EDEMA ASSOCIATED WITH TYPE 2 DIABETES MELLITUS: ICD-10-CM

## 2022-02-15 DIAGNOSIS — E11.65 UNCONTROLLED TYPE 2 DIABETES MELLITUS WITH HYPERGLYCEMIA, WITH LONG-TERM CURRENT USE OF INSULIN: ICD-10-CM

## 2022-02-15 DIAGNOSIS — E11.42 DIABETIC PERIPHERAL NEUROPATHY ASSOCIATED WITH TYPE 2 DIABETES MELLITUS: ICD-10-CM

## 2022-02-15 DIAGNOSIS — E55.9 VITAMIN D DEFICIENCY: ICD-10-CM

## 2022-02-15 LAB
ESTIMATED AVG GLUCOSE: 186 MG/DL (ref 68–131)
GLUCOSE SERPL-MCNC: 286 MG/DL (ref 70–110)
HBA1C MFR BLD: 8.1 % (ref 4–5.6)

## 2022-02-15 PROCEDURE — 99214 OFFICE O/P EST MOD 30 MIN: CPT | Mod: S$GLB,,, | Performed by: NURSE PRACTITIONER

## 2022-02-15 PROCEDURE — 3074F PR MOST RECENT SYSTOLIC BLOOD PRESSURE < 130 MM HG: ICD-10-PCS | Mod: CPTII,S$GLB,, | Performed by: NURSE PRACTITIONER

## 2022-02-15 PROCEDURE — 1160F RVW MEDS BY RX/DR IN RCRD: CPT | Mod: CPTII,S$GLB,, | Performed by: NURSE PRACTITIONER

## 2022-02-15 PROCEDURE — 99999 PR PBB SHADOW E&M-EST. PATIENT-LVL III: ICD-10-PCS | Mod: PBBFAC,,, | Performed by: NURSE PRACTITIONER

## 2022-02-15 PROCEDURE — 1159F PR MEDICATION LIST DOCUMENTED IN MEDICAL RECORD: ICD-10-PCS | Mod: CPTII,S$GLB,, | Performed by: NURSE PRACTITIONER

## 2022-02-15 PROCEDURE — 3052F PR MOST RECENT HEMOGLOBIN A1C LEVEL 8.0 - < 9.0%: ICD-10-PCS | Mod: CPTII,S$GLB,, | Performed by: NURSE PRACTITIONER

## 2022-02-15 PROCEDURE — 82962 GLUCOSE BLOOD TEST: CPT | Mod: S$GLB,,, | Performed by: NURSE PRACTITIONER

## 2022-02-15 PROCEDURE — 82962 POCT GLUCOSE, HAND-HELD DEVICE: ICD-10-PCS | Mod: S$GLB,,, | Performed by: NURSE PRACTITIONER

## 2022-02-15 PROCEDURE — 3008F BODY MASS INDEX DOCD: CPT | Mod: CPTII,S$GLB,, | Performed by: NURSE PRACTITIONER

## 2022-02-15 PROCEDURE — 99999 PR PBB SHADOW E&M-EST. PATIENT-LVL III: CPT | Mod: PBBFAC,,, | Performed by: NURSE PRACTITIONER

## 2022-02-15 PROCEDURE — 1159F MED LIST DOCD IN RCRD: CPT | Mod: CPTII,S$GLB,, | Performed by: NURSE PRACTITIONER

## 2022-02-15 PROCEDURE — 99214 PR OFFICE/OUTPT VISIT, EST, LEVL IV, 30-39 MIN: ICD-10-PCS | Mod: S$GLB,,, | Performed by: NURSE PRACTITIONER

## 2022-02-15 PROCEDURE — 1160F PR REVIEW ALL MEDS BY PRESCRIBER/CLIN PHARMACIST DOCUMENTED: ICD-10-PCS | Mod: CPTII,S$GLB,, | Performed by: NURSE PRACTITIONER

## 2022-02-15 PROCEDURE — 3079F DIAST BP 80-89 MM HG: CPT | Mod: CPTII,S$GLB,, | Performed by: NURSE PRACTITIONER

## 2022-02-15 PROCEDURE — 3079F PR MOST RECENT DIASTOLIC BLOOD PRESSURE 80-89 MM HG: ICD-10-PCS | Mod: CPTII,S$GLB,, | Performed by: NURSE PRACTITIONER

## 2022-02-15 PROCEDURE — 36415 COLL VENOUS BLD VENIPUNCTURE: CPT | Performed by: NURSE PRACTITIONER

## 2022-02-15 PROCEDURE — 83036 HEMOGLOBIN GLYCOSYLATED A1C: CPT | Performed by: NURSE PRACTITIONER

## 2022-02-15 PROCEDURE — 3008F PR BODY MASS INDEX (BMI) DOCUMENTED: ICD-10-PCS | Mod: CPTII,S$GLB,, | Performed by: NURSE PRACTITIONER

## 2022-02-15 PROCEDURE — 3074F SYST BP LT 130 MM HG: CPT | Mod: CPTII,S$GLB,, | Performed by: NURSE PRACTITIONER

## 2022-02-15 PROCEDURE — 3052F HG A1C>EQUAL 8.0%<EQUAL 9.0%: CPT | Mod: CPTII,S$GLB,, | Performed by: NURSE PRACTITIONER

## 2022-02-15 RX ORDER — DULAGLUTIDE 1.5 MG/.5ML
1.5 INJECTION, SOLUTION SUBCUTANEOUS
Qty: 4 PEN | Refills: 5 | Status: SHIPPED | OUTPATIENT
Start: 2022-02-15 | End: 2022-02-15

## 2022-02-15 RX ORDER — INSULIN LISPRO 100 [IU]/ML
INJECTION, SOLUTION INTRAVENOUS; SUBCUTANEOUS
Qty: 15 ML | Refills: 5 | Status: SHIPPED | OUTPATIENT
Start: 2022-02-15 | End: 2022-10-26 | Stop reason: SDUPTHER

## 2022-02-15 RX ORDER — DULAGLUTIDE 3 MG/.5ML
3 INJECTION, SOLUTION SUBCUTANEOUS
Qty: 4 PEN | Refills: 5 | Status: SHIPPED | OUTPATIENT
Start: 2022-02-15 | End: 2022-07-22 | Stop reason: SDUPTHER

## 2022-02-15 RX ORDER — INSULIN GLARGINE 100 [IU]/ML
40 INJECTION, SOLUTION SUBCUTANEOUS DAILY
Qty: 15 ML | Refills: 5 | Status: SHIPPED | OUTPATIENT
Start: 2022-02-15 | End: 2022-10-26 | Stop reason: SDUPTHER

## 2022-02-15 RX ORDER — EMPAGLIFLOZIN AND METFORMIN HYDROCHLORIDE 12.5; 1 MG/1; MG/1
1 TABLET ORAL DAILY
Qty: 30 TABLET | Refills: 5 | Status: SHIPPED | OUTPATIENT
Start: 2022-02-15 | End: 2022-03-03 | Stop reason: ALTCHOICE

## 2022-02-15 NOTE — PATIENT INSTRUCTIONS
-- Medications adjusted for today's visit include:    Reduce Lantus 35 units once daily.    Re-start Trulicity 3 mg once a week.     Stop Metformin when you are able to obtain Synjardy. Start Synjardy 1 tablet daily.    Take Humalog 3 times a day as needed per sliding scale: VISIT [employer] to obtain coupon card for Humalog.    160-200: 2 units  201-240: 4 units  241-280: 6 units  >280: 8 units    -- Limit carbs to no more than 30-45 grams with each meal. Never eat carbs by themselves, always add protein. Make snacks low carb or non-carb only.    -- Start checking blood sugar 3 times daily: Fasting blood sugar and vary your next 2 readings: Before lunch, before supper, 2 hours after any meal, or bedtime.   -Blood sugar goals should be a fasting blood sugar between , and no blood sugars throughout the day over 180 is good, less than 160 better, less than 140 perfect.    --Carry glucose tablets/soft peppermints/regular juice or Coke product with you at all times to treat a low blood sugar episode (less than 70). If your blood sugar is between 50-70, Chew 4 tablets or drink 1/2 cup of juice or regular Coke product. If your blood sugar is below 50, double the treatment. Re-check blood sugar in 15 minutes. If still low, repeat this. Always call the clinic to give an update for any low blood sugar episodes.    --Exercise as tolerated: Goal 30 minutes/day, 5 days/week. Start slowly and increase as tolerated.    --Follow-up for your next visit in 6 weeks.     --Please either drop off, fax, or MyChart your readings to me as needed.

## 2022-02-15 NOTE — PROGRESS NOTES
Subjective:         Patient ID: Che Shay is a 46 y.o. female.  Patient's current PCP is Luis Alfredo Mckinney MD.     Chief Complaint: Diabetes Mellitus    HPI  Che Shay is a 46 y.o. Black or  female presenting for a follow up for diabetes. Patient has been diagnosed with type 2 diabetes since 2007 .    CURRENT DM MEDICATIONS:   Diabetes Medications             dulaglutide (TRULICITY) 1.5 mg/0.5 mL pen injector Inject 1.5 mg into the skin every 7 days.    insulin (LANTUS SOLOSTAR U-100 INSULIN) glargine 100 units/mL (3mL) SubQ pen Inject 40 Units into the skin once daily.    insulin aspart, niacinamide, (FIASP FLEXTOUCH U-100 INSULIN) 100 unit/mL (3 mL) InPn Inject every 4 hours if needed to correct a high blood sugar. Please dispense 15 mL, 5 pens/ 30 day supply    metFORMIN (GLUCOPHAGE XR) 500 MG ER 24hr tablet Take 1 tablet (500 mg total) by mouth 2 (two) times daily with meals.        Past failed treatment include: None    Blood glucose testing is performed regularly. Patient is testing 1 times per day.  Meter: Reli On & Contour Next  Preferred lab: [facility]    Any episodes of hypoglycemia? no     Complications related to diabetes: retinopathy and peripheral neuropathy    Her blood sugar in the clinic today was:   Lab Results   Component Value Date    POCGLU 286 (A) 02/15/2022       Che Shay presents today for follow up visit to discuss diabetes management. At last visit, she was re-started on Metformin, Trulicity initiated, and mealtime insulin started per SS due to acute glucose toxicity. Lantus was lowered. Unfortunately, Fiasp was too expensive. She did purchase Regular insulin on her own.  She reports the following: not following a consistent regimen. Usually monitoring blood sugar only once per day. Fasting BGs have been as low as the 70s. Glucose range  mg/dL.      Neuropathy- On Gabapentin.    Vitamin D deficiency- Takes Vit D 25,000 International Units  weekly.    Mild DR- 12/9/2021 last visit. She confirmed this with the eye doctor office.    Current diet:  Bfast- often skipped. Drinks protein shake occasionally. Snacks- candy,chips. Lunch-leftovers. Supper- cooked at home.   Activity Level: not active currently    Lab Results   Component Value Date    HGBA1C 9.0 (H) 11/05/2021    HGBA1C 13.8 (H) 03/20/2017    HGBA1C 13.3 (H) 11/01/2016       STANDARDS OF CARE  Diabetes Management Status    Statin: Not taking  ACE/ARB: Not taking    Screening or Prevention Patient's value Goal Complete/Controlled?   HgA1C Testing and Control   Lab Results   Component Value Date    HGBA1C 9.0 (H) 11/05/2021      Annually/Less than 8% No   Lipid profile : 11/05/2021 Annually Yes   LDL control Lab Results   Component Value Date    LDLCALC 76.8 11/05/2021    Annually/Less than 100 mg/dl  Yes   Nephropathy screening Lab Results   Component Value Date    LABMICR <2.5 03/20/2017     No results found for: PROTEINUA  Lab Results   Component Value Date    UTPCR 0.08 03/03/2016      Annually No   Blood pressure BP Readings from Last 1 Encounters:   02/15/22 122/80    Less than 140/90 Yes   Dilated retinal exam : 12/09/2021 Annually Yes-mild DR   Foot exam   : 11/01/2021 Annually Yes     Labs reviewed and are noted below.    Lab Results   Component Value Date    WBC 9.35 11/05/2021    HGB 10.6 (L) 11/05/2021    HCT 32.6 (L) 11/05/2021     11/05/2021    CHOL 148 11/05/2021    TRIG 126 11/05/2021    HDL 46 11/05/2021    LDLCALC 76.8 11/05/2021    ALT 20 11/05/2021    AST 16 11/05/2021     (L) 11/05/2021    K 4.3 11/05/2021    CL 99 11/05/2021    ANIONGAP 10 11/05/2021    CREATININE 0.8 11/05/2021    ESTGFRAFRICA >60.0 11/05/2021    EGFRNONAA >60.0 11/05/2021    BUN 11 11/05/2021    CO2 24 11/05/2021    TSH 1.848 11/05/2021     (H) 11/05/2021    UTPCR 0.08 03/03/2016     Lab Results   Component Value Date    GLUTAMICACID 0.00 11/21/2016    CPEPTIDE 2.0 11/21/2016    FREET4  1.35 10/21/2013    TSH 1.848 11/05/2021    CALCIUM 9.6 11/05/2021     Lab Results   Component Value Date    CPEPTIDE 2.0 11/21/2016     Lab Results   Component Value Date    GLUTAMICACID 0.00 11/21/2016     Glucose   Date Value Ref Range Status   11/05/2021 263 (H) 70 - 110 mg/dL Final     Anion Gap   Date Value Ref Range Status   11/05/2021 10 8 - 16 mmol/L Final     eGFR if    Date Value Ref Range Status   11/05/2021 >60.0 >60 mL/min/1.73 m^2 Final     eGFR if non    Date Value Ref Range Status   11/05/2021 >60.0 >60 mL/min/1.73 m^2 Final     Comment:     Calculation used to obtain the estimated glomerular filtration  rate (eGFR) is the CKD-EPI equation.          The following portions of the patient's history were reviewed and updated as appropriate: allergies, current medications, past family history, past medical history, past social history, past surgical history and problem list.    Review of patient's allergies indicates:  No Known Allergies  Social History     Socioeconomic History    Marital status:     Number of children: 2   Occupational History    Occupation: LPN     Employer: [employer]     Comment: [employer]   Tobacco Use    Smoking status: Never Smoker    Smokeless tobacco: Never Used   Substance and Sexual Activity    Alcohol use: Yes     Alcohol/week: 0.0 standard drinks     Comment: occassionally    Drug use: No    Sexual activity: Yes     Partners: Male     Birth control/protection: None   Other Topics Concern    Are you pregnant or think you may be? No    Breast-feeding No   Social History Narrative    . LPN. Wears seatbelt.     Past Medical History:   Diagnosis Date    Depression with anxiety     GERD (gastroesophageal reflux disease)     IBS (irritable bowel syndrome)     Type II or unspecified type diabetes mellitus without mention of complication, uncontrolled     Vitamin D deficiency         REVIEW OF SYSTEMS:  Eyes Reports mild DR.  Cardiovascular: No history of HTN or hyperlipidemia.  GI: Tolerating Trulicity well without GI side effects   : No CKD.  Neuro: Positive neuropathy.  PSYCH: No tobacco use.  ENDO: See HPI.        Objective:      Vitals:    02/15/22 1328   BP: 122/80   Pulse: 105     RESPIRATORY: No respiratory distress  NEUROLOGIC: Cranial nerves II-XII grossly intact.   PSYCHIATRIC: Alert & oriented x3. Normal mood and affect.  FOOT EXAMINATION: UTD  Assessment:       1. Uncontrolled type 2 diabetes mellitus with hyperglycemia, with long-term current use of insulin    2. Vitamin D deficiency    3. Diabetic peripheral neuropathy associated with type 2 diabetes mellitus    4. Mild nonproliferative diabetic retinopathy of both eyes without macular edema associated with type 2 diabetes mellitus        Plan:   Uncontrolled type 2 diabetes mellitus with hyperglycemia, with long-term current use of insulin-Chronic  -     POCT Glucose, Hand-Held Device  -     insulin (LANTUS SOLOSTAR U-100 INSULIN) glargine 100 units/mL (3mL) SubQ pen; Inject 40 Units into the skin once daily.  Dispense: 15 mL; Refill: 5  -     insulin lispro (HUMALOG KWIKPEN INSULIN) 100 unit/mL pen; Inject every 4 hours if needed to correct a high blood sugar.  Dispense: 15 mL; Refill: 5  -     dulaglutide (TRULICITY) 1.5 mg/0.5 mL pen injector; Inject 1.5 mg into the skin every 7 days.  Dispense: 4 pen; Refill: 5    -- Medications adjusted for today's visit include:    Reduce Lantus 35 units once daily.    Re-start Trulicity 3 mg once a week.     Stop Metformin when you are able to obtain Synjardy. Start Synjardy 1 tablet daily.    Take Humalog 3 times a day as needed per sliding scale: VISIT [employer] to obtain coupon card for Humalog.    160-200: 2 units  201-240: 4 units  241-280: 6 units  >280: 8 units    Vitamin D deficiency-Chronic    -Continue Vit D weekly.    Diabetic peripheral  "neuropathy associated with type 2 diabetes mellitus-Chronic,stable    -Continue Gabapentin.    Mild nonproliferative diabetic retinopathy of both eyes without macular edema associated with type 2 diabetes mellitus-Chronic,stable    -Follow up with ophthalmology as advised.    - Follow up: 6 weeks    I spent a total of 30 minutes on the day of the visit.This includes face to face time and non-face to face time preparing to see the patient (eg, review of tests), documenting clinical information in the electronic record, independently interpreting results and communicating results to the patient.    Portions of this note may have been created with voice recognition software. Occasional "wrong-word" or "sound-a-like" substitutions may have occurred due to the inherent limitations of voice recognition software. Please, read the note carefully and recognize, using context, where substitutions have occurred.       "

## 2022-03-02 ENCOUNTER — PATIENT MESSAGE (OUTPATIENT)
Dept: PODIATRY | Facility: CLINIC | Age: 47
End: 2022-03-02
Payer: COMMERCIAL

## 2022-03-02 ENCOUNTER — PATIENT MESSAGE (OUTPATIENT)
Dept: GASTROENTEROLOGY | Facility: CLINIC | Age: 47
End: 2022-03-02
Payer: COMMERCIAL

## 2022-03-03 ENCOUNTER — PATIENT MESSAGE (OUTPATIENT)
Dept: DIABETES | Facility: CLINIC | Age: 47
End: 2022-03-03
Payer: COMMERCIAL

## 2022-03-03 DIAGNOSIS — R82.90 FOUL SMELLING URINE: ICD-10-CM

## 2022-03-03 DIAGNOSIS — B37.31 VAGINAL CANDIDIASIS: ICD-10-CM

## 2022-03-03 DIAGNOSIS — E11.65 UNCONTROLLED TYPE 2 DIABETES MELLITUS WITH HYPERGLYCEMIA, WITH LONG-TERM CURRENT USE OF INSULIN: Primary | ICD-10-CM

## 2022-03-03 DIAGNOSIS — Z79.4 UNCONTROLLED TYPE 2 DIABETES MELLITUS WITH HYPERGLYCEMIA, WITH LONG-TERM CURRENT USE OF INSULIN: Primary | ICD-10-CM

## 2022-03-03 DIAGNOSIS — R30.0 DYSURIA: ICD-10-CM

## 2022-03-03 RX ORDER — FLUCONAZOLE 150 MG/1
TABLET ORAL
Qty: 2 TABLET | Refills: 0 | Status: SHIPPED | OUTPATIENT
Start: 2022-03-03 | End: 2022-10-27 | Stop reason: SDUPTHER

## 2022-03-03 RX ORDER — METFORMIN HYDROCHLORIDE 500 MG/1
500 TABLET, EXTENDED RELEASE ORAL 2 TIMES DAILY WITH MEALS
Qty: 180 TABLET | Refills: 3 | Status: SHIPPED | OUTPATIENT
Start: 2022-03-03 | End: 2022-10-26 | Stop reason: SDUPTHER

## 2022-03-03 NOTE — TELEPHONE ENCOUNTER
Noted. Stop Synjardy and return to Glucophage  mg 1 tablet twice a day. I want her to check some urine studies- where would she like to complete? I will also call in Diflucan for her to take 1 tablet today and can repeat the dose in 3 days if still having symptoms.

## 2022-03-08 ENCOUNTER — LAB VISIT (OUTPATIENT)
Dept: LAB | Facility: HOSPITAL | Age: 47
End: 2022-03-08
Payer: COMMERCIAL

## 2022-03-08 ENCOUNTER — PATIENT MESSAGE (OUTPATIENT)
Dept: DIABETES | Facility: CLINIC | Age: 47
End: 2022-03-08
Payer: COMMERCIAL

## 2022-03-08 DIAGNOSIS — R82.90 FOUL SMELLING URINE: ICD-10-CM

## 2022-03-08 DIAGNOSIS — B37.31 VAGINAL CANDIDIASIS: ICD-10-CM

## 2022-03-08 DIAGNOSIS — R30.0 DYSURIA: ICD-10-CM

## 2022-03-08 LAB
BACTERIA #/AREA URNS HPF: ABNORMAL /HPF
BILIRUB UR QL STRIP: NEGATIVE
CLARITY UR: CLEAR
COLOR UR: YELLOW
GLUCOSE UR QL STRIP: ABNORMAL
HGB UR QL STRIP: NEGATIVE
KETONES UR QL STRIP: ABNORMAL
LEUKOCYTE ESTERASE UR QL STRIP: NEGATIVE
MICROSCOPIC COMMENT: ABNORMAL
NITRITE UR QL STRIP: NEGATIVE
PH UR STRIP: 6 [PH] (ref 5–8)
PROT UR QL STRIP: NEGATIVE
SP GR UR STRIP: 1.02 (ref 1–1.03)
SQUAMOUS #/AREA URNS HPF: 10 /HPF
URN SPEC COLLECT METH UR: ABNORMAL
YEAST URNS QL MICRO: ABNORMAL

## 2022-03-08 PROCEDURE — 81000 URINALYSIS NONAUTO W/SCOPE: CPT | Performed by: NURSE PRACTITIONER

## 2022-03-16 ENCOUNTER — OFFICE VISIT (OUTPATIENT)
Dept: PODIATRY | Facility: CLINIC | Age: 47
End: 2022-03-16
Payer: COMMERCIAL

## 2022-03-16 VITALS — HEIGHT: 68 IN | BODY MASS INDEX: 33.21 KG/M2 | WEIGHT: 219.13 LBS

## 2022-03-16 DIAGNOSIS — M79.675 PAIN OF LEFT GREAT TOE: ICD-10-CM

## 2022-03-16 DIAGNOSIS — M79.674 PAIN OF RIGHT GREAT TOE: ICD-10-CM

## 2022-03-16 DIAGNOSIS — L60.2 ONYCHOGRYPHOSIS: Primary | ICD-10-CM

## 2022-03-16 PROCEDURE — 3008F PR BODY MASS INDEX (BMI) DOCUMENTED: ICD-10-PCS | Mod: CPTII,S$GLB,, | Performed by: PODIATRIST

## 2022-03-16 PROCEDURE — 1159F PR MEDICATION LIST DOCUMENTED IN MEDICAL RECORD: ICD-10-PCS | Mod: CPTII,S$GLB,, | Performed by: PODIATRIST

## 2022-03-16 PROCEDURE — 1159F MED LIST DOCD IN RCRD: CPT | Mod: CPTII,S$GLB,, | Performed by: PODIATRIST

## 2022-03-16 PROCEDURE — 99999 PR PBB SHADOW E&M-EST. PATIENT-LVL III: ICD-10-PCS | Mod: PBBFAC,,, | Performed by: PODIATRIST

## 2022-03-16 PROCEDURE — 3008F BODY MASS INDEX DOCD: CPT | Mod: CPTII,S$GLB,, | Performed by: PODIATRIST

## 2022-03-16 PROCEDURE — 3052F PR MOST RECENT HEMOGLOBIN A1C LEVEL 8.0 - < 9.0%: ICD-10-PCS | Mod: CPTII,S$GLB,, | Performed by: PODIATRIST

## 2022-03-16 PROCEDURE — 99213 PR OFFICE/OUTPT VISIT, EST, LEVL III, 20-29 MIN: ICD-10-PCS | Mod: S$GLB,,, | Performed by: PODIATRIST

## 2022-03-16 PROCEDURE — 3052F HG A1C>EQUAL 8.0%<EQUAL 9.0%: CPT | Mod: CPTII,S$GLB,, | Performed by: PODIATRIST

## 2022-03-16 PROCEDURE — 99999 PR PBB SHADOW E&M-EST. PATIENT-LVL III: CPT | Mod: PBBFAC,,, | Performed by: PODIATRIST

## 2022-03-16 PROCEDURE — 99213 OFFICE O/P EST LOW 20 MIN: CPT | Mod: S$GLB,,, | Performed by: PODIATRIST

## 2022-03-16 PROCEDURE — 1160F PR REVIEW ALL MEDS BY PRESCRIBER/CLIN PHARMACIST DOCUMENTED: ICD-10-PCS | Mod: CPTII,S$GLB,, | Performed by: PODIATRIST

## 2022-03-16 PROCEDURE — 1160F RVW MEDS BY RX/DR IN RCRD: CPT | Mod: CPTII,S$GLB,, | Performed by: PODIATRIST

## 2022-03-16 NOTE — PROGRESS NOTES
Subjective:       Patient ID: Che Shay is a 46 y.o. female.    Chief Complaint: Nail Problem (0/10 pain. C/o reoccurring fungus to hallux. Diabetic PCP: Dr. Mckinney last seen 11/01/21. )    HPI: Che Shay presents to the office today, s/p 10/19/2021 B/L great toe nail avulsion due to onychomycosis and onychogryphosis. States no pains at present. States the nails are growing back slightly thick.    Review of patient's allergies indicates:  No Known Allergies    Past Medical History:   Diagnosis Date    Depression with anxiety     GERD (gastroesophageal reflux disease)     IBS (irritable bowel syndrome)     Type II or unspecified type diabetes mellitus without mention of complication, uncontrolled     Vitamin D deficiency        Family History   Problem Relation Age of Onset    Diabetes Mother     Hypertension Mother     Glaucoma Father     Cancer Paternal Aunt     Stroke Paternal Aunt     Hypertension Paternal Aunt     Hypertension Paternal Uncle     Cancer Paternal Grandmother     Cancer Paternal Grandfather     No Known Problems Sister     No Known Problems Brother     No Known Problems Daughter     No Known Problems Daughter     No Known Problems Brother     Heart disease Neg Hx     Melanoma Neg Hx     Psoriasis Neg Hx     Lupus Neg Hx        Social History     Socioeconomic History    Marital status:     Number of children: 2   Occupational History    Occupation: LPN     Employer: [employer]     Comment: [employer]   Tobacco Use    Smoking status: Never Smoker    Smokeless tobacco: Never Used   Substance and Sexual Activity    Alcohol use: Yes     Alcohol/week: 0.0 standard drinks     Comment: occassionally    Drug use: No    Sexual activity: Yes     Partners: Male     Birth control/protection: None   Other Topics Concern    Are you pregnant or think you may be? No    Breast-feeding No   Social History Narrative    .  "LPN. Wears seatbelt.       Past Surgical History:   Procedure Laterality Date    BREAST SURGERY  2000    Reduction     SECTION, LOW TRANSVERSE      x 2    CHOLECYSTECTOMY  2013    Right ankle surgery  2015       Review of Systems   Constitutional: Negative for chills, fatigue and fever.   HENT: Negative for hearing loss.    Eyes: Negative for photophobia and visual disturbance.   Respiratory: Negative for cough, chest tightness, shortness of breath and wheezing.    Cardiovascular: Negative for chest pain and palpitations.   Gastrointestinal: Negative for constipation, diarrhea, nausea and vomiting.   Endocrine: Negative for cold intolerance and heat intolerance.   Genitourinary: Negative for flank pain.   Musculoskeletal: Negative for neck pain and neck stiffness.   Neurological: Negative for light-headedness and headaches.   Psychiatric/Behavioral: Negative for sleep disturbance.          Objective:   Ht 5' 8" (1.727 m)   Wt 99.4 kg (219 lb 2.2 oz)   BMI 33.32 kg/m²     No image results found.       Physical Exam  LOWER EXTREMITY PHYSICAL EXAMINATION  DERMATOLOGY: Skin is supple, dry and intact. Mild onychogryphosis is noted to the B/L great toe nail plate.     VASCULAR: The B/L dorsalis pedis pulse is 2/4 and the posterior tibial pulse is 2/4. Hair growth is noted on the dorsal foot and digits. Proximal to distal, warm to warm. Capillary refill time is WNL at less than 3s.    Assessment:     1. Onychogryphosis    2. Pain of left great toe    3. Pain of right great toe        Plan:     Onychogryphosis    Pain of left great toe    Pain of right great toe    Thorough discussion is had with the patient today, concerning the diagnosis, its etiology, and the treatment algorithm at present.     Did discuss repeat avulsion(s) with matrix for definitive cure.    Did discuss avulsion(s) followed by KeryFlex application.          Future Appointments   Date Time Provider Department Center   3/29/2022  2:00 PM " Flavia Bruner, NP [facility] [place]

## 2022-03-22 ENCOUNTER — PATIENT MESSAGE (OUTPATIENT)
Dept: INTERNAL MEDICINE | Facility: CLINIC | Age: 47
End: 2022-03-22
Payer: COMMERCIAL

## 2022-03-23 ENCOUNTER — PATIENT MESSAGE (OUTPATIENT)
Dept: INTERNAL MEDICINE | Facility: CLINIC | Age: 47
End: 2022-03-23
Payer: COMMERCIAL

## 2022-03-28 ENCOUNTER — TELEPHONE (OUTPATIENT)
Dept: DIABETES | Facility: CLINIC | Age: 47
End: 2022-03-28
Payer: COMMERCIAL

## 2022-04-19 DIAGNOSIS — Z12.11 ENCOUNTER FOR SCREENING COLONOSCOPY: Primary | ICD-10-CM

## 2022-06-30 ENCOUNTER — HOSPITAL ENCOUNTER (OUTPATIENT)
Dept: RADIOLOGY | Facility: HOSPITAL | Age: 47
Discharge: HOME | End: 2022-06-30
Attending: NURSE PRACTITIONER
Payer: COMMERCIAL

## 2022-06-30 ENCOUNTER — OFFICE VISIT (OUTPATIENT)
Dept: OBSTETRICS AND GYNECOLOGY | Facility: CLINIC | Age: 47
End: 2022-06-30
Payer: COMMERCIAL

## 2022-06-30 VITALS — HEIGHT: 68 IN | BODY MASS INDEX: 31.54 KG/M2 | WEIGHT: 208.13 LBS

## 2022-06-30 VITALS
SYSTOLIC BLOOD PRESSURE: 118 MMHG | DIASTOLIC BLOOD PRESSURE: 72 MMHG | BODY MASS INDEX: 31.64 KG/M2 | WEIGHT: 208.13 LBS

## 2022-06-30 DIAGNOSIS — Z01.419 WELL WOMAN EXAM WITH ROUTINE GYNECOLOGICAL EXAM: Primary | ICD-10-CM

## 2022-06-30 DIAGNOSIS — N89.8 VAGINAL DISCHARGE: ICD-10-CM

## 2022-06-30 DIAGNOSIS — Z12.31 ENCOUNTER FOR SCREENING MAMMOGRAM FOR MALIGNANT NEOPLASM OF BREAST: ICD-10-CM

## 2022-06-30 DIAGNOSIS — B37.31 VULVOVAGINAL CANDIDIASIS: ICD-10-CM

## 2022-06-30 DIAGNOSIS — Z12.4 ENCOUNTER FOR SCREENING FOR CERVICAL CANCER: ICD-10-CM

## 2022-06-30 DIAGNOSIS — B37.2 CANDIDAL SKIN INFECTION: ICD-10-CM

## 2022-06-30 PROCEDURE — 99999 PR PBB SHADOW E&M-EST. PATIENT-LVL IV: ICD-10-PCS | Mod: PBBFAC,,, | Performed by: NURSE PRACTITIONER

## 2022-06-30 PROCEDURE — 99386 PREV VISIT NEW AGE 40-64: CPT | Mod: S$GLB,,, | Performed by: NURSE PRACTITIONER

## 2022-06-30 PROCEDURE — 3052F PR MOST RECENT HEMOGLOBIN A1C LEVEL 8.0 - < 9.0%: ICD-10-PCS | Mod: CPTII,S$GLB,, | Performed by: NURSE PRACTITIONER

## 2022-06-30 PROCEDURE — 77067 SCR MAMMO BI INCL CAD: CPT | Mod: 26,,, | Performed by: RADIOLOGY

## 2022-06-30 PROCEDURE — 3078F DIAST BP <80 MM HG: CPT | Mod: CPTII,S$GLB,, | Performed by: NURSE PRACTITIONER

## 2022-06-30 PROCEDURE — 1159F PR MEDICATION LIST DOCUMENTED IN MEDICAL RECORD: ICD-10-PCS | Mod: CPTII,S$GLB,, | Performed by: NURSE PRACTITIONER

## 2022-06-30 PROCEDURE — 88175 CYTOPATH C/V AUTO FLUID REDO: CPT | Performed by: NURSE PRACTITIONER

## 2022-06-30 PROCEDURE — 3078F PR MOST RECENT DIASTOLIC BLOOD PRESSURE < 80 MM HG: ICD-10-PCS | Mod: CPTII,S$GLB,, | Performed by: NURSE PRACTITIONER

## 2022-06-30 PROCEDURE — 3074F PR MOST RECENT SYSTOLIC BLOOD PRESSURE < 130 MM HG: ICD-10-PCS | Mod: CPTII,S$GLB,, | Performed by: NURSE PRACTITIONER

## 2022-06-30 PROCEDURE — 3008F BODY MASS INDEX DOCD: CPT | Mod: CPTII,S$GLB,, | Performed by: NURSE PRACTITIONER

## 2022-06-30 PROCEDURE — 99386 PR PREVENTIVE VISIT,NEW,40-64: ICD-10-PCS | Mod: S$GLB,,, | Performed by: NURSE PRACTITIONER

## 2022-06-30 PROCEDURE — 1159F MED LIST DOCD IN RCRD: CPT | Mod: CPTII,S$GLB,, | Performed by: NURSE PRACTITIONER

## 2022-06-30 PROCEDURE — 87801 DETECT AGNT MULT DNA AMPLI: CPT | Performed by: NURSE PRACTITIONER

## 2022-06-30 PROCEDURE — 3074F SYST BP LT 130 MM HG: CPT | Mod: CPTII,S$GLB,, | Performed by: NURSE PRACTITIONER

## 2022-06-30 PROCEDURE — 77067 MAMMO DIGITAL SCREENING BILAT WITH TOMO: ICD-10-PCS | Mod: 26,,, | Performed by: RADIOLOGY

## 2022-06-30 PROCEDURE — 3052F HG A1C>EQUAL 8.0%<EQUAL 9.0%: CPT | Mod: CPTII,S$GLB,, | Performed by: NURSE PRACTITIONER

## 2022-06-30 PROCEDURE — 99999 PR PBB SHADOW E&M-EST. PATIENT-LVL IV: CPT | Mod: PBBFAC,,, | Performed by: NURSE PRACTITIONER

## 2022-06-30 PROCEDURE — 87624 HPV HI-RISK TYP POOLED RSLT: CPT | Performed by: NURSE PRACTITIONER

## 2022-06-30 PROCEDURE — 77063 BREAST TOMOSYNTHESIS BI: CPT | Mod: 26,,, | Performed by: RADIOLOGY

## 2022-06-30 PROCEDURE — 77067 SCR MAMMO BI INCL CAD: CPT | Mod: TC

## 2022-06-30 PROCEDURE — 77063 BREAST TOMOSYNTHESIS BI: CPT | Mod: TC

## 2022-06-30 PROCEDURE — 87481 CANDIDA DNA AMP PROBE: CPT | Mod: 59 | Performed by: NURSE PRACTITIONER

## 2022-06-30 PROCEDURE — 3008F PR BODY MASS INDEX (BMI) DOCUMENTED: ICD-10-PCS | Mod: CPTII,S$GLB,, | Performed by: NURSE PRACTITIONER

## 2022-06-30 PROCEDURE — 77063 MAMMO DIGITAL SCREENING BILAT WITH TOMO: ICD-10-PCS | Mod: 26,,, | Performed by: RADIOLOGY

## 2022-06-30 RX ORDER — NYSTATIN 100000 [USP'U]/G
POWDER TOPICAL
Qty: 60 G | Refills: 1 | Status: SHIPPED | OUTPATIENT
Start: 2022-06-30 | End: 2023-11-15

## 2022-06-30 RX ORDER — DULAGLUTIDE 0.75 MG/.5ML
INJECTION, SOLUTION SUBCUTANEOUS
COMMUNITY
Start: 2022-01-06 | End: 2022-10-26

## 2022-06-30 RX ORDER — EMPAGLIFLOZIN AND METFORMIN HYDROCHLORIDE 12.5; 1 MG/1; MG/1
1 TABLET ORAL DAILY
COMMUNITY
Start: 2022-06-07 | End: 2022-10-26 | Stop reason: SDUPTHER

## 2022-06-30 RX ORDER — CHLORHEXIDINE GLUCONATE ORAL RINSE 1.2 MG/ML
15 SOLUTION DENTAL 2 TIMES DAILY
COMMUNITY
Start: 2022-01-12

## 2022-06-30 RX ORDER — BIMATOPROST 3 UG/ML
SOLUTION TOPICAL
COMMUNITY
Start: 2022-06-02

## 2022-06-30 RX ORDER — SPIRONOLACTONE 100 MG/1
100 TABLET, FILM COATED ORAL 2 TIMES DAILY
COMMUNITY
Start: 2022-05-02

## 2022-06-30 RX ORDER — TERCONAZOLE 4 MG/G
1 CREAM VAGINAL NIGHTLY
Qty: 45 G | Refills: 0 | Status: SHIPPED | OUTPATIENT
Start: 2022-06-30 | End: 2022-07-07

## 2022-06-30 NOTE — PROGRESS NOTES
Subjective:       Patient ID: Che Shay is a 47 y.o. female.    Chief Complaint:  Annual Exam    Patient's last menstrual period was 2022 (exact date).  History of Present Illness  Annual Exam-Premenopausal  Patient presents for annual exam. The patient is sexually active. GYN screening history: last pap: approximate date 7/10/17 and was normal. Has not had a mammogram. Will schedule today. The patient wears seatbelts: yes. The patient participates in regular exercise: no. Has the patient ever been transfused or tattooed?: yes. The patient reports that there is not domestic violence in her life.  Patient with complaints of recurrent yeast infections.  Patient with history of type 2 diabetes. Last A1C 8.1. Patient reports onset of yeast infections when dietary intake is high in sugar/carbs. Patient reports experiencing intermittent itching under breasts, bilaterally, as well as to groin area.     OB History    Para Term  AB Living   2 2 2     2   SAB IAB Ectopic Multiple Live Births                  # Outcome Date GA Lbr Eugene/2nd Weight Sex Delivery Anes PTL Lv   2 Term      CS-Unspec      1 Term      CS-Unspec          Review of Systems  Review of Systems   Constitutional: Negative for appetite change, fatigue, fever and unexpected weight change.   Eyes: Negative for visual disturbance.   Cardiovascular: Negative for chest pain.   Gastrointestinal: Negative for abdominal pain, bloating, constipation, diarrhea, nausea and vomiting.   Genitourinary: Positive for vaginal discharge. Negative for bladder incontinence, dysmenorrhea, dyspareunia, dysuria, flank pain, frequency, genital sores, menorrhagia, menstrual problem, pelvic pain, urgency, vaginal bleeding, vaginal pain, postcoital bleeding, vaginal dryness and vaginal odor.        Vaginal itching, irritation   Integumentary:  Positive for rash. Negative for acne, mole/lesion, breast mass, nipple discharge, breast skin changes and breast  tenderness.   Neurological: Negative for syncope and headaches.   Hematological: Negative for adenopathy. Does not bruise/bleed easily.   All other systems reviewed and are negative.  Breast: Positive for breast self exam.Negative for asymmetry, lump, mass, nipple discharge, skin changes and tenderness           Objective:      Physical Exam:   Constitutional: She is oriented to person, place, and time. She appears well-developed and well-nourished.    HENT:   Head: Normocephalic and atraumatic.    Eyes: Pupils are equal, round, and reactive to light. Conjunctivae and EOM are normal.     Cardiovascular: Normal rate and regular rhythm.     Pulmonary/Chest: Effort normal. Right breast exhibits no inverted nipple, no mass, no nipple discharge, no skin change, no tenderness, no bleeding and no swelling. Left breast exhibits no inverted nipple, no mass, no nipple discharge, no skin change, no tenderness, no bleeding and no swelling. Breasts are symmetrical.        Abdominal: Soft. Hernia confirmed negative in the right inguinal area and confirmed negative in the left inguinal area.     Genitourinary:    Inguinal canal, uterus, right adnexa, left adnexa and rectum normal.            Pelvic exam was performed with patient supine.   The external female genitalia was normal.   Genitalia hair distrobution normal .   Labial bartholins normal.There is no rash, tenderness, lesion or injury on the right labia. There is no rash, tenderness, lesion or injury on the left labia. Cervix is normal. There is vaginal discharge (white, thick) in the vagina. No erythema, bleeding, rectocele, cystocele or unspecified prolapse of vaginal walls in the vagina. Cervix exhibits no motion tenderness and no friability. Uterus is not tender.           Musculoskeletal: Normal range of motion and moves all extremeties.      Lymphadenopathy: No inguinal adenopathy noted on the right or left side.    Neurological: She is alert and oriented to person,  place, and time.    Skin: Skin is warm and dry. No rash noted. No erythema.    Psychiatric: She has a normal mood and affect. Her behavior is normal. Judgment and thought content normal.            Assessment:     1. Well woman exam with routine gynecological exam    2. Encounter for screening for cervical cancer    3. Encounter for screening mammogram for malignant neoplasm of breast    4. Vulvovaginal candidiasis    5. Vaginal discharge    6. Candidal skin infection              Plan:   Che was seen today for annual exam.    Diagnoses and all orders for this visit:    Well woman exam with routine gynecological exam  -     Liquid-Based Pap Smear, Screening  -     HPV High Risk Genotypes, PCR    Encounter for screening for cervical cancer  -     Liquid-Based Pap Smear, Screening  -     HPV High Risk Genotypes, PCR    Encounter for screening mammogram for malignant neoplasm of breast  -     Mammo Digital Screening Bilat w/ Anshul; Future    Vulvovaginal candidiasis  -     terconazole (TERAZOL 7) 0.4 % Crea; Place 1 applicator vaginally every evening. for 7 days    Vaginal discharge  -     Vaginosis Screen by DNA Probe    Candidal skin infection  -     nystatin (MYCOSTATIN) powder; Apply to affected area 3 times daily      Begin terazol today.   Will follow vaginosis screen and treat additionally if indicated.  Nystatin to skin folds as needed.  Discussed importance of glycemic control.    Follow up with me in 1 year for annual well woman exam.

## 2022-07-07 LAB
FINAL PATHOLOGIC DIAGNOSIS: NORMAL
HPV HR 12 DNA SPEC QL NAA+PROBE: NEGATIVE
HPV16 AG SPEC QL: NEGATIVE
HPV18 DNA SPEC QL NAA+PROBE: NEGATIVE
Lab: NORMAL

## 2022-07-08 LAB
BACTERIAL VAGINOSIS DNA: NEGATIVE
CANDIDA GLABRATA DNA: NEGATIVE
CANDIDA KRUSEI DNA: NEGATIVE
CANDIDA RRNA VAG QL PROBE: NEGATIVE
T VAGINALIS RRNA GENITAL QL PROBE: NEGATIVE

## 2022-07-22 ENCOUNTER — HOSPITAL ENCOUNTER (OUTPATIENT)
Dept: RADIOLOGY | Facility: HOSPITAL | Age: 47
Discharge: HOME | End: 2022-07-22
Attending: NURSE PRACTITIONER
Payer: COMMERCIAL

## 2022-07-22 ENCOUNTER — OFFICE VISIT (OUTPATIENT)
Dept: GASTROENTEROLOGY | Facility: CLINIC | Age: 47
End: 2022-07-22
Payer: COMMERCIAL

## 2022-07-22 VITALS
HEART RATE: 88 BPM | OXYGEN SATURATION: 96 % | BODY MASS INDEX: 33.08 KG/M2 | HEIGHT: 68 IN | WEIGHT: 218.25 LBS | DIASTOLIC BLOOD PRESSURE: 60 MMHG | SYSTOLIC BLOOD PRESSURE: 120 MMHG

## 2022-07-22 DIAGNOSIS — R19.5 CHANGE IN CONSISTENCY OF STOOL: ICD-10-CM

## 2022-07-22 DIAGNOSIS — R14.3 FLATULENCE: ICD-10-CM

## 2022-07-22 DIAGNOSIS — R14.0 ABDOMINAL BLOATING: Primary | ICD-10-CM

## 2022-07-22 DIAGNOSIS — R14.0 ABDOMINAL BLOATING: ICD-10-CM

## 2022-07-22 PROCEDURE — 99214 OFFICE O/P EST MOD 30 MIN: CPT | Mod: S$GLB,,, | Performed by: NURSE PRACTITIONER

## 2022-07-22 PROCEDURE — 74019 RADEX ABDOMEN 2 VIEWS: CPT | Mod: TC

## 2022-07-22 PROCEDURE — 3078F PR MOST RECENT DIASTOLIC BLOOD PRESSURE < 80 MM HG: ICD-10-PCS | Mod: CPTII,S$GLB,, | Performed by: NURSE PRACTITIONER

## 2022-07-22 PROCEDURE — 3078F DIAST BP <80 MM HG: CPT | Mod: CPTII,S$GLB,, | Performed by: NURSE PRACTITIONER

## 2022-07-22 PROCEDURE — 3008F BODY MASS INDEX DOCD: CPT | Mod: CPTII,S$GLB,, | Performed by: NURSE PRACTITIONER

## 2022-07-22 PROCEDURE — 1159F PR MEDICATION LIST DOCUMENTED IN MEDICAL RECORD: ICD-10-PCS | Mod: CPTII,S$GLB,, | Performed by: NURSE PRACTITIONER

## 2022-07-22 PROCEDURE — 3074F SYST BP LT 130 MM HG: CPT | Mod: CPTII,S$GLB,, | Performed by: NURSE PRACTITIONER

## 2022-07-22 PROCEDURE — 1159F MED LIST DOCD IN RCRD: CPT | Mod: CPTII,S$GLB,, | Performed by: NURSE PRACTITIONER

## 2022-07-22 PROCEDURE — 99999 PR PBB SHADOW E&M-EST. PATIENT-LVL V: CPT | Mod: PBBFAC,,, | Performed by: NURSE PRACTITIONER

## 2022-07-22 PROCEDURE — 74019 RADEX ABDOMEN 2 VIEWS: CPT | Mod: 26,,, | Performed by: RADIOLOGY

## 2022-07-22 PROCEDURE — 1160F RVW MEDS BY RX/DR IN RCRD: CPT | Mod: CPTII,S$GLB,, | Performed by: NURSE PRACTITIONER

## 2022-07-22 PROCEDURE — 3008F PR BODY MASS INDEX (BMI) DOCUMENTED: ICD-10-PCS | Mod: CPTII,S$GLB,, | Performed by: NURSE PRACTITIONER

## 2022-07-22 PROCEDURE — 3074F PR MOST RECENT SYSTOLIC BLOOD PRESSURE < 130 MM HG: ICD-10-PCS | Mod: CPTII,S$GLB,, | Performed by: NURSE PRACTITIONER

## 2022-07-22 PROCEDURE — 1160F PR REVIEW ALL MEDS BY PRESCRIBER/CLIN PHARMACIST DOCUMENTED: ICD-10-PCS | Mod: CPTII,S$GLB,, | Performed by: NURSE PRACTITIONER

## 2022-07-22 PROCEDURE — 3052F HG A1C>EQUAL 8.0%<EQUAL 9.0%: CPT | Mod: CPTII,S$GLB,, | Performed by: NURSE PRACTITIONER

## 2022-07-22 PROCEDURE — 99999 PR PBB SHADOW E&M-EST. PATIENT-LVL V: ICD-10-PCS | Mod: PBBFAC,,, | Performed by: NURSE PRACTITIONER

## 2022-07-22 PROCEDURE — 3052F PR MOST RECENT HEMOGLOBIN A1C LEVEL 8.0 - < 9.0%: ICD-10-PCS | Mod: CPTII,S$GLB,, | Performed by: NURSE PRACTITIONER

## 2022-07-22 PROCEDURE — 99214 PR OFFICE/OUTPT VISIT, EST, LEVL IV, 30-39 MIN: ICD-10-PCS | Mod: S$GLB,,, | Performed by: NURSE PRACTITIONER

## 2022-07-22 PROCEDURE — 74019 XR ABDOMEN FLAT AND ERECT: ICD-10-PCS | Mod: 26,,, | Performed by: RADIOLOGY

## 2022-07-22 NOTE — PROGRESS NOTES
Clinic Follow Up:  [facility] Follow Up Note    Reason for Follow Up:  The primary encounter diagnosis was Abdominal bloating. Diagnoses of Flatulence and Change in consistency of stool were also pertinent to this visit.    PCP: Luis Alfredo Mckinney       HPI:  This is a 47 y.o. female here for follow up of abdominal pain.   She started with symptoms in 2022. She is having abdominal bloating and increased gas/flatulence   She reports having altering bowels from constipation to loose stools.   She reports having a bowel movement every other day. Sometimes they feel incomplete. No hematochezia or melena.     Review of Systems   Constitutional: Negative for activity change and appetite change.        As per interval history above   Respiratory: Negative for cough and shortness of breath.    Cardiovascular: Negative for chest pain.   Gastrointestinal: Positive for abdominal pain, constipation and diarrhea. Negative for abdominal distention, anal bleeding, blood in stool, nausea, rectal pain and vomiting.   Skin: Negative for color change and rash.       Medical History:  Past Medical History:   Diagnosis Date    Depression with anxiety     GERD (gastroesophageal reflux disease)     IBS (irritable bowel syndrome)     Type II or unspecified type diabetes mellitus without mention of complication, uncontrolled     Vitamin D deficiency        Surgical History:   Past Surgical History:   Procedure Laterality Date    BREAST SURGERY  2000    Reduction     SECTION, LOW TRANSVERSE      x 2    CHOLECYSTECTOMY  2013    Right ankle surgery  2015    TOTAL REDUCTION MAMMOPLASTY Bilateral        Family History:   Family History   Problem Relation Age of Onset    Diabetes Mother     Hypertension Mother     Glaucoma Father     Cancer Paternal Aunt     Stroke Paternal Aunt     Hypertension Paternal Aunt     Hypertension Paternal Uncle     Cancer Paternal Grandmother      Cancer Paternal Grandfather     No Known Problems Sister     No Known Problems Brother     No Known Problems Daughter     No Known Problems Daughter     No Known Problems Brother     Heart disease Neg Hx     Melanoma Neg Hx     Psoriasis Neg Hx     Lupus Neg Hx        Social History:   Social History     Tobacco Use    Smoking status: Never Smoker    Smokeless tobacco: Never Used   Substance Use Topics    Alcohol use: Yes     Alcohol/week: 0.0 standard drinks     Comment: occassionally    Drug use: No       Allergies: Review of patient's allergies indicates:  No Known Allergies    Home Medications:  Current Outpatient Medications on File Prior to Visit   Medication Sig Dispense Refill    bimatoprost (LATISSE) 0.03 % ophthalmic solution APPLY TO EYELID MARGINS ONCE DAILY AS NEEDED      blood sugar diagnostic Strp Check blood sugar 3 times daily. Please dispense test strips that are approved on her plan. 100 each 11    blood-glucose meter kit Please dispense the meter that is preferred on her plan. Use as instructed 1 each 0    chlorhexidine (PERIDEX) 0.12 % solution 15 mLs 2 (two) times daily.      cholecalciferol, vitamin D3, 625 mcg (25,000 unit) Cap Take 1 capsule by mouth once a week. 12 capsule 1    fluconazole (DIFLUCAN) 150 MG Tab Take 1 tablet by mouth today and can repeat dose in 3 days if still having symptoms. 2 tablet 0    FLUoxetine 10 MG capsule Take 1 capsule (10 mg total) by mouth once daily. 30 capsule 11    gabapentin (NEURONTIN) 300 MG capsule Take 1 capsule (300 mg total) by mouth every evening. 180 capsule 1    insulin (LANTUS SOLOSTAR U-100 INSULIN) glargine 100 units/mL (3mL) SubQ pen Inject 40 Units into the skin once daily. 15 mL 5    insulin lispro (HUMALOG KWIKPEN INSULIN) 100 unit/mL pen Inject every 4 hours if needed to correct a high blood sugar. 15 mL 5    lancets Misc Check blood sugar 3 times daily. Please dispense lancets that are approved on her insurance.  "100 each 11    multivit-minerals/folic acid (ADULT MULTIVITAMIN GUMMIES ORAL) Take by mouth as needed.      nystatin (MYCOSTATIN) powder Apply to affected area 3 times daily 60 g 1    pen needle, diabetic (BD ULTRA-FINE CAMERON PEN NEEDLE) 32 gauge x 5/32" Ndle Use with insulin 4-5 times daily 200 each 5    spironolactone (ALDACTONE) 100 MG tablet Take 100 mg by mouth 2 (two) times daily.      SYNJARDY 12.5-1,000 mg Tab Take 1 tablet by mouth once daily.      traZODone (DESYREL) 50 MG tablet Take 1 tablet by mouth in the evening 90 tablet 0    TRULICITY 0.75 mg/0.5 mL pen injector Inject into the skin.      dulaglutide (TRULICITY) 3 mg/0.5 mL pen injector Inject 3 mg into the skin every 7 days. 4 pen 5    metFORMIN (GLUCOPHAGE XR) 500 MG ER 24hr tablet Take 1 tablet (500 mg total) by mouth 2 (two) times daily with meals. (Patient not taking: Reported on 7/22/2022) 180 tablet 3    pantoprazole (PROTONIX) 40 MG tablet Take 1 tablet (40 mg total) by mouth 2 (two) times daily. for 14 days (Patient not taking: Reported on 7/22/2022) 28 tablet 0     No current facility-administered medications on file prior to visit.       /60   Pulse 88   Ht 5' 8" (1.727 m)   Wt 99 kg (218 lb 4.1 oz)   SpO2 96%   BMI 33.19 kg/m²   Body mass index is 33.19 kg/m².  Physical Exam    Labs: Pertinent labs reviewed.  CRC Screening:     Assessment:   1. Abdominal bloating    2. Flatulence    3. Change in consistency of stool      Likely constipation as etiology     Recommendations:   - xray today   - if xray normal, will check for H. Pylori and start on Bentyl PRN. Also recommend, low FODMAP diet.     Abdominal bloating  -     X-Ray Abdomen Flat And Erect; Future; Expected date: 07/22/2022    Flatulence  -     X-Ray Abdomen Flat And Erect; Future; Expected date: 07/22/2022    Change in consistency of stool  -     X-Ray Abdomen Flat And Erect; Future; Expected date: 07/22/2022      Return to Clinic:  Follow up to be determined " after results/ procedure(s).    Thank you for the opportunity to participate in the care of this patient.  RAJIV Kirkpatrick

## 2022-07-28 ENCOUNTER — PATIENT MESSAGE (OUTPATIENT)
Dept: GASTROENTEROLOGY | Facility: CLINIC | Age: 47
End: 2022-07-28
Payer: COMMERCIAL

## 2022-07-28 DIAGNOSIS — R14.0 ABDOMINAL BLOATING: Primary | ICD-10-CM

## 2022-07-28 RX ORDER — DICYCLOMINE HYDROCHLORIDE 20 MG/1
20 TABLET ORAL 3 TIMES DAILY PRN
Qty: 90 TABLET | Refills: 3 | Status: SHIPPED | OUTPATIENT
Start: 2022-07-28 | End: 2023-11-15 | Stop reason: SDUPTHER

## 2022-08-05 ENCOUNTER — LAB VISIT (OUTPATIENT)
Dept: LAB | Facility: HOSPITAL | Age: 47
End: 2022-08-05
Payer: COMMERCIAL

## 2022-08-05 DIAGNOSIS — R14.0 ABDOMINAL BLOATING: ICD-10-CM

## 2022-08-05 PROCEDURE — 87338 HPYLORI STOOL AG IA: CPT | Performed by: NURSE PRACTITIONER

## 2022-08-09 ENCOUNTER — PATIENT MESSAGE (OUTPATIENT)
Dept: PODIATRY | Facility: CLINIC | Age: 47
End: 2022-08-09
Payer: COMMERCIAL

## 2022-08-10 LAB
H PYLORI AG STL QL IA: NOT DETECTED
SPECIMEN SOURCE: NORMAL

## 2022-08-11 ENCOUNTER — PATIENT MESSAGE (OUTPATIENT)
Dept: HEPATOLOGY | Facility: CLINIC | Age: 47
End: 2022-08-11
Payer: COMMERCIAL

## 2022-08-14 DIAGNOSIS — E11.9 TYPE 2 DIABETES MELLITUS WITHOUT COMPLICATION, UNSPECIFIED WHETHER LONG TERM INSULIN USE: ICD-10-CM

## 2022-09-22 ENCOUNTER — OFFICE VISIT (OUTPATIENT)
Dept: PODIATRY | Facility: CLINIC | Age: 47
End: 2022-09-22
Payer: COMMERCIAL

## 2022-09-22 VITALS — WEIGHT: 218 LBS | BODY MASS INDEX: 33.04 KG/M2 | HEIGHT: 68 IN

## 2022-09-22 DIAGNOSIS — L60.2 ONYCHOGRYPHOSIS: Primary | ICD-10-CM

## 2022-09-22 DIAGNOSIS — B35.1 ONYCHOMYCOSIS: ICD-10-CM

## 2022-09-22 DIAGNOSIS — M79.674 PAIN OF RIGHT GREAT TOE: ICD-10-CM

## 2022-09-22 DIAGNOSIS — M79.675 PAIN OF LEFT GREAT TOE: ICD-10-CM

## 2022-09-22 PROCEDURE — 1159F MED LIST DOCD IN RCRD: CPT | Mod: CPTII,S$GLB,, | Performed by: PODIATRIST

## 2022-09-22 PROCEDURE — 99999 PR PBB SHADOW E&M-EST. PATIENT-LVL III: CPT | Mod: PBBFAC,,, | Performed by: PODIATRIST

## 2022-09-22 PROCEDURE — 1159F PR MEDICATION LIST DOCUMENTED IN MEDICAL RECORD: ICD-10-PCS | Mod: CPTII,S$GLB,, | Performed by: PODIATRIST

## 2022-09-22 PROCEDURE — 3008F PR BODY MASS INDEX (BMI) DOCUMENTED: ICD-10-PCS | Mod: CPTII,S$GLB,, | Performed by: PODIATRIST

## 2022-09-22 PROCEDURE — 99999 PR PBB SHADOW E&M-EST. PATIENT-LVL III: ICD-10-PCS | Mod: PBBFAC,,, | Performed by: PODIATRIST

## 2022-09-22 PROCEDURE — 3008F BODY MASS INDEX DOCD: CPT | Mod: CPTII,S$GLB,, | Performed by: PODIATRIST

## 2022-09-22 PROCEDURE — 99213 PR OFFICE/OUTPT VISIT, EST, LEVL III, 20-29 MIN: ICD-10-PCS | Mod: S$GLB,,, | Performed by: PODIATRIST

## 2022-09-22 PROCEDURE — 3052F PR MOST RECENT HEMOGLOBIN A1C LEVEL 8.0 - < 9.0%: ICD-10-PCS | Mod: CPTII,S$GLB,, | Performed by: PODIATRIST

## 2022-09-22 PROCEDURE — 3052F HG A1C>EQUAL 8.0%<EQUAL 9.0%: CPT | Mod: CPTII,S$GLB,, | Performed by: PODIATRIST

## 2022-09-22 PROCEDURE — 99213 OFFICE O/P EST LOW 20 MIN: CPT | Mod: S$GLB,,, | Performed by: PODIATRIST

## 2022-09-22 NOTE — PROGRESS NOTES
[facility]  PODIATRIC MEDICINE AND SURGERY      CHIEF COMPLAINT   Chief Complaint   Patient presents with    Nail Problem     C/o nail fungus, b/l hallux, acrylic stuck on the nail, pt has tried 100% acetone, 0 pain, x 4 weeks, has been trying different forms of tx at home, diabetic, wears casual shoes, last seen diabetes management Feli Bruner on 02/15/22         HPI:    Che Shay is a 47 y.o. female presenting to podiatry clinic with complaint of fungal infection on both great toenails. She states she has an acrylic overlay and nail salon tech was unable to remove. Of note, she has had nail surgery in the past (Dr. Rodriguez). She did not use topical antifungal after removal and therefore nail fungus/discoloration returned.  Patient inquires about available treatment options. No further pedal complaints.      PMH  Past Medical History:   Diagnosis Date    Depression with anxiety     GERD (gastroesophageal reflux disease)     IBS (irritable bowel syndrome)     Type II or unspecified type diabetes mellitus without mention of complication, uncontrolled     Vitamin D deficiency        PROBLEM LIST  Patient Active Problem List    Diagnosis Date Noted    Diabetic peripheral neuropathy associated with type 2 diabetes mellitus 12/30/2021    Mild nonproliferative diabetic retinopathy of both eyes without macular edema associated with type 2 diabetes mellitus 12/30/2021    Hair loss 11/01/2021    Uncontrolled type 2 diabetes mellitus with hyperglycemia, with long-term current use of insulin 03/01/2016    Vitamin D deficiency 01/09/2015    GERD (gastroesophageal reflux disease) 10/02/2013    Depression with anxiety 10/02/2013    Obesity 10/02/2013    IBS (irritable bowel syndrome) 06/22/2013       MEDS  Current Outpatient Medications on File Prior to Visit   Medication Sig Dispense Refill    bimatoprost (LATISSE) 0.03 % ophthalmic solution APPLY TO EYELID MARGINS ONCE DAILY AS NEEDED      blood sugar  "diagnostic Strp Check blood sugar 3 times daily. Please dispense test strips that are approved on her plan. 100 each 11    blood-glucose meter kit Please dispense the meter that is preferred on her plan. Use as instructed 1 each 0    chlorhexidine (PERIDEX) 0.12 % solution 15 mLs 2 (two) times daily.      cholecalciferol, vitamin D3, 625 mcg (25,000 unit) Cap Take 1 capsule by mouth once a week. 12 capsule 1    dicyclomine (BENTYL) 20 mg tablet Take 1 tablet (20 mg total) by mouth 3 (three) times daily as needed (abdominal pain). 90 tablet 3    fluconazole (DIFLUCAN) 150 MG Tab Take 1 tablet by mouth today and can repeat dose in 3 days if still having symptoms. 2 tablet 0    FLUoxetine 10 MG capsule Take 1 capsule (10 mg total) by mouth once daily. 30 capsule 11    gabapentin (NEURONTIN) 300 MG capsule Take 1 capsule (300 mg total) by mouth every evening. 180 capsule 1    insulin (LANTUS SOLOSTAR U-100 INSULIN) glargine 100 units/mL (3mL) SubQ pen Inject 40 Units into the skin once daily. 15 mL 5    insulin lispro (HUMALOG KWIKPEN INSULIN) 100 unit/mL pen Inject every 4 hours if needed to correct a high blood sugar. 15 mL 5    lancets Misc Check blood sugar 3 times daily. Please dispense lancets that are approved on her insurance. 100 each 11    metFORMIN (GLUCOPHAGE XR) 500 MG ER 24hr tablet Take 1 tablet (500 mg total) by mouth 2 (two) times daily with meals. 180 tablet 3    multivit-minerals/folic acid (ADULT MULTIVITAMIN GUMMIES ORAL) Take by mouth as needed.      nystatin (MYCOSTATIN) powder Apply to affected area 3 times daily 60 g 1    pen needle, diabetic (BD ULTRA-FINE CAMERON PEN NEEDLE) 32 gauge x 5/32" Ndle Use with insulin 4-5 times daily 200 each 5    spironolactone (ALDACTONE) 100 MG tablet Take 100 mg by mouth 2 (two) times daily.      SYNJARDY 12.5-1,000 mg Tab Take 1 tablet by mouth once daily.      traZODone (DESYREL) 50 MG tablet Take 1 tablet by mouth in the evening 90 tablet 0    TRULICITY 0.75 " "mg/0.5 mL pen injector Inject into the skin.      pantoprazole (PROTONIX) 40 MG tablet Take 1 tablet (40 mg total) by mouth 2 (two) times daily. for 14 days (Patient not taking: Reported on 2022) 28 tablet 0     No current facility-administered medications on file prior to visit.       PSH     Past Surgical History:   Procedure Laterality Date    BREAST SURGERY  2000    Reduction     SECTION, LOW TRANSVERSE      x 2    CHOLECYSTECTOMY  2013    Right ankle surgery  2015    TOTAL REDUCTION MAMMOPLASTY Bilateral 2001        ALL  Review of patient's allergies indicates:  No Known Allergies    SOC     Social History     Tobacco Use    Smoking status: Never    Smokeless tobacco: Never   Substance Use Topics    Alcohol use: Yes     Alcohol/week: 0.0 standard drinks     Comment: occassionally    Drug use: No         Family HX    Family History   Problem Relation Age of Onset    Diabetes Mother     Hypertension Mother     Glaucoma Father     Cancer Paternal Aunt     Stroke Paternal Aunt     Hypertension Paternal Aunt     Hypertension Paternal Uncle     Cancer Paternal Grandmother     Cancer Paternal Grandfather     No Known Problems Sister     No Known Problems Brother     No Known Problems Daughter     No Known Problems Daughter     No Known Problems Brother     Heart disease Neg Hx     Melanoma Neg Hx     Psoriasis Neg Hx     Lupus Neg Hx             REVIEW OF SYSTEMS  General: Denies any fever or chills  Chest: Denies shortness of breath, wheezing, coughing, or sputum production  Heart: Denies chest pain, cold extremities, orthopenia, or reduced exercise tolerance  As noted above and per history of current illness above, otherwise negative in the remainder of the 14 systems.      PHYSICAL EXAM:      Vitals:    22 1341   Weight: 98.9 kg (218 lb)   Height: 5' 8" (1.727 m)   PainSc: 0-No pain       General: This patient is well-developed, well-nourished and appears stated age, " well-oriented to person, place and time, and cooperative and pleasant on today's visit    LOWER EXTREMITY PHYSICAL EXAM  VASCULAR  Dorsalis pedis and posterior tibial pulses palpable 2/4 bilaterally.   Capillary refill time immediate to the toes.   Feet are warm to the touch. Skin temperature warm to warm from proximally to distally   There are no varicosities, telangiectasias noted to bilateral foot and ankle regions.   There are no ecchymoses noted to bilateral foot and ankle regions.   There is no gross lower extremity edema.    DERMATOLOGIC  There are thickened discolored, elongated mycotic nails suggestive of onychomycosis bl hallux with acrylic overlay  Skin moist with healthy texture and turgor.  There are no open ulcerations, lacerations, or fissures to bilateral foot and ankle regions. There are no signs of infection as there is no erythema, no proximal-extending lymphangiitis, no fluctuance, or crepitus noted on palpation to bilateral foot and ankle regions.   There is no interdigital maceration.   There are no hyperkeratotic lesions noted to feet.     NEUROLOGIC  Epicritic sensation is intact as the patient is able to sense light touch to bilateral foot and ankle regions.   Achilles and patellar deep tendon reflexes intact  Babinski reflex absent    ORTHOPEDIC/BIOMECHANICAL  No symptomatic structural abnormalities noted  Muscle strength AT/EHL/EDL/PT: 5/5; Achilles/Gastroc/Soleus: 5/5; PB/PL: 5/5 Muscle tone is normal.  Ankle joint ROM INTACT DF/PF, non-crepitus      ASSESSMENT   Onychogryphosis    Onychomycosis    Pain of left great toe    Pain of right great toe      PLAN    1. Patient was educated about clinical and imaging findings, and verbalizes understanding of above.  2. I Discussed treatment options for nail fungus.   -I explained that fungus lives in a warm dark moist environment and therefore patient should make every attempt to keep feet clean and dry.  We discussed drying feet thoroughly  after shower particularly between the toes and then applying powder between the toes and in the shoes. I also discussed to disinfect shower tub, discard old shoes, and disinfect current shoes with antiseptic    -We also discussed final resolution of nail dystrophy via total nail removal either temporary or permanent. Patient was informed that nail fungus might return even after nail removal, however increased prognosis in resolution of nail fungus with combination therapy.    Pt elects for removal + nail culture + oral/topical therapy    3. RTC  for follow up/evaluation as scheduled    Future Appointments   Date Time Provider Department Center   10/13/2022  1:45 PM WILIAN Hamilton [facility] [place]   10/26/2022  9:30 AM Flavia Bruner NP [facility]       Report Electronically Signed By:     Bree Coelho DPM   Podiatry  [facility]  9/22/2022

## 2022-10-18 ENCOUNTER — OFFICE VISIT (OUTPATIENT)
Dept: INTERNAL MEDICINE | Facility: CLINIC | Age: 47
End: 2022-10-18
Payer: COMMERCIAL

## 2022-10-18 ENCOUNTER — HOSPITAL ENCOUNTER (OUTPATIENT)
Dept: CARDIOLOGY | Facility: HOSPITAL | Age: 47
Discharge: HOME | End: 2022-10-18
Attending: INTERNAL MEDICINE
Payer: COMMERCIAL

## 2022-10-18 VITALS
WEIGHT: 210.13 LBS | BODY MASS INDEX: 31.85 KG/M2 | OXYGEN SATURATION: 100 % | HEART RATE: 86 BPM | DIASTOLIC BLOOD PRESSURE: 78 MMHG | RESPIRATION RATE: 20 BRPM | SYSTOLIC BLOOD PRESSURE: 126 MMHG | TEMPERATURE: 98 F | HEIGHT: 68 IN

## 2022-10-18 DIAGNOSIS — Z01.818 PREOP EXAMINATION: Primary | ICD-10-CM

## 2022-10-18 DIAGNOSIS — E11.42 DIABETIC PERIPHERAL NEUROPATHY ASSOCIATED WITH TYPE 2 DIABETES MELLITUS: ICD-10-CM

## 2022-10-18 DIAGNOSIS — Z01.818 PREOP EXAMINATION: ICD-10-CM

## 2022-10-18 DIAGNOSIS — F41.8 DEPRESSION WITH ANXIETY: ICD-10-CM

## 2022-10-18 PROCEDURE — 93010 ELECTROCARDIOGRAM REPORT: CPT | Mod: ,,, | Performed by: INTERNAL MEDICINE

## 2022-10-18 PROCEDURE — 93005 ELECTROCARDIOGRAM TRACING: CPT

## 2022-10-18 PROCEDURE — 3044F PR MOST RECENT HEMOGLOBIN A1C LEVEL <7.0%: ICD-10-PCS | Mod: CPTII,S$GLB,, | Performed by: INTERNAL MEDICINE

## 2022-10-18 PROCEDURE — 1159F MED LIST DOCD IN RCRD: CPT | Mod: CPTII,S$GLB,, | Performed by: INTERNAL MEDICINE

## 2022-10-18 PROCEDURE — 99999 PR PBB SHADOW E&M-EST. PATIENT-LVL V: ICD-10-PCS | Mod: PBBFAC,,, | Performed by: INTERNAL MEDICINE

## 2022-10-18 PROCEDURE — 99214 OFFICE O/P EST MOD 30 MIN: CPT | Mod: S$GLB,,, | Performed by: INTERNAL MEDICINE

## 2022-10-18 PROCEDURE — 99999 PR PBB SHADOW E&M-EST. PATIENT-LVL V: CPT | Mod: PBBFAC,,, | Performed by: INTERNAL MEDICINE

## 2022-10-18 PROCEDURE — 3044F HG A1C LEVEL LT 7.0%: CPT | Mod: CPTII,S$GLB,, | Performed by: INTERNAL MEDICINE

## 2022-10-18 PROCEDURE — 3074F PR MOST RECENT SYSTOLIC BLOOD PRESSURE < 130 MM HG: ICD-10-PCS | Mod: CPTII,S$GLB,, | Performed by: INTERNAL MEDICINE

## 2022-10-18 PROCEDURE — 1159F PR MEDICATION LIST DOCUMENTED IN MEDICAL RECORD: ICD-10-PCS | Mod: CPTII,S$GLB,, | Performed by: INTERNAL MEDICINE

## 2022-10-18 PROCEDURE — 99214 PR OFFICE/OUTPT VISIT, EST, LEVL IV, 30-39 MIN: ICD-10-PCS | Mod: S$GLB,,, | Performed by: INTERNAL MEDICINE

## 2022-10-18 PROCEDURE — 3078F PR MOST RECENT DIASTOLIC BLOOD PRESSURE < 80 MM HG: ICD-10-PCS | Mod: CPTII,S$GLB,, | Performed by: INTERNAL MEDICINE

## 2022-10-18 PROCEDURE — 93010 EKG 12-LEAD: ICD-10-PCS | Mod: ,,, | Performed by: INTERNAL MEDICINE

## 2022-10-18 PROCEDURE — 3078F DIAST BP <80 MM HG: CPT | Mod: CPTII,S$GLB,, | Performed by: INTERNAL MEDICINE

## 2022-10-18 PROCEDURE — 3074F SYST BP LT 130 MM HG: CPT | Mod: CPTII,S$GLB,, | Performed by: INTERNAL MEDICINE

## 2022-10-18 RX ORDER — FLUOXETINE 10 MG/1
10 CAPSULE ORAL DAILY
Qty: 90 CAPSULE | Refills: 0 | Status: SHIPPED | OUTPATIENT
Start: 2022-10-18 | End: 2023-02-13

## 2022-10-18 RX ORDER — GABAPENTIN 300 MG/1
300 CAPSULE ORAL 2 TIMES DAILY
Qty: 180 CAPSULE | Refills: 1 | Status: SHIPPED | OUTPATIENT
Start: 2022-10-18 | End: 2023-11-15

## 2022-10-18 RX ORDER — TRAZODONE HYDROCHLORIDE 50 MG/1
50 TABLET ORAL NIGHTLY
Qty: 90 TABLET | Refills: 0 | Status: SHIPPED | OUTPATIENT
Start: 2022-10-18 | End: 2023-11-15 | Stop reason: SDUPTHER

## 2022-10-18 NOTE — PROGRESS NOTES
Subjective:      Patient ID: Che Shay is a 47 y.o. female.    Chief Complaint: Medication Refill, clearance, and Labs Only    Medication Refill  Pertinent negatives include no abdominal pain, chest pain, chills, coughing, fever or sore throat.       48 yo with   Patient Active Problem List   Diagnosis    GERD (gastroesophageal reflux disease)    Depression with anxiety    Obesity    IBS (irritable bowel syndrome)    Vitamin D deficiency    Uncontrolled type 2 diabetes mellitus with hyperglycemia, with long-term current use of insulin    Hair loss    Diabetic peripheral neuropathy associated with type 2 diabetes mellitus    Mild nonproliferative diabetic retinopathy of both eyes without macular edema associated with type 2 diabetes mellitus     Past Medical History:   Diagnosis Date    Depression with anxiety     GERD (gastroesophageal reflux disease)     IBS (irritable bowel syndrome)     Type II or unspecified type diabetes mellitus without mention of complication, uncontrolled     Vitamin D deficiency      Here today for preop exam for abdominaplasty planned for November.   Pt requests vit d level drawn.     She is able to walk up at least 2 flights of stairs without dyspnea or CP.    Past Surgical History:   Procedure Laterality Date    BREAST SURGERY  2000    Reduction     SECTION, LOW TRANSVERSE      x 2    CHOLECYSTECTOMY  2013    Right ankle surgery  2015    TOTAL REDUCTION MAMMOPLASTY Bilateral 2001   No h/o anesthesia complications    Social History     Socioeconomic History    Marital status:     Number of children: 2   Occupational History    Occupation: LPN     Employer: [employer]     Comment: [employer]   Tobacco Use    Smoking status: Never    Smokeless tobacco: Never   Substance and Sexual Activity    Alcohol use: Yes     Alcohol/week: 0.0 standard drinks     Comment: occassionally    Drug use: No    Sexual activity: Yes      "Partners: Male     Birth control/protection: None   Other Topics Concern    Are you pregnant or think you may be? No    Breast-feeding No   Social History Narrative    . LPN. Wears seatbelt.     family history includes Cancer in her paternal aunt, paternal grandfather, and paternal grandmother; Diabetes in her mother; Glaucoma in her father; Hypertension in her mother, paternal aunt, and paternal uncle; No Known Problems in her brother, brother, daughter, daughter, and sister; Stroke in her paternal aunt.  No f/h of bleeding or clotting d/o.         Review of Systems   Constitutional:  Negative for chills and fever.   HENT:  Negative for ear pain and sore throat.    Respiratory:  Negative for cough.    Cardiovascular:  Negative for chest pain.   Gastrointestinal:  Negative for abdominal pain and blood in stool.   Genitourinary:  Negative for dysuria and hematuria.   Neurological:  Negative for seizures and syncope.   Objective:   /78 (BP Location: Right arm, Patient Position: Sitting, BP Method: Large (Manual))   Pulse 86   Temp 98.3 °F (36.8 °C)   Resp 20   Ht 5' 8" (1.727 m)   Wt 95.3 kg (210 lb 1.6 oz)   SpO2 100%   BMI 31.95 kg/m²     Physical Exam  Constitutional:       General: She is awake. She is not in acute distress.     Appearance: Normal appearance. She is well-developed.   HENT:      Head: Normocephalic and atraumatic.      Right Ear: External ear normal.      Left Ear: External ear normal.   Eyes:      Conjunctiva/sclera: Conjunctivae normal.      Pupils: Pupils are equal, round, and reactive to light.   Neck:      Thyroid: No thyromegaly.   Cardiovascular:      Rate and Rhythm: Normal rate and regular rhythm.   Pulmonary:      Effort: Pulmonary effort is normal.      Breath sounds: Normal breath sounds. No wheezing or rales.   Abdominal:      General: Bowel sounds are normal.      Palpations: Abdomen is soft.      Tenderness: There is no abdominal tenderness.   Musculoskeletal:    "      General: No swelling.      Cervical back: Normal range of motion and neck supple.   Lymphadenopathy:      Cervical: No cervical adenopathy.   Skin:     General: Skin is warm and dry.   Neurological:      Mental Status: She is alert and oriented to person, place, and time. Mental status is at baseline.   Psychiatric:         Mood and Affect: Mood normal.         Behavior: Behavior normal. Behavior is cooperative.         Thought Content: Thought content normal.         Judgment: Judgment normal.     Lab Visit on 10/18/2022   Component Date Value Ref Range Status    Hemoglobin A1C 10/18/2022 6.1 (H)  4.0 - 5.6 % Final    Comment: ADA Screening Guidelines:  5.7-6.4%  Consistent with prediabetes  >or=6.5%  Consistent with diabetes    High levels of fetal hemoglobin interfere with the HbA1C  assay. Heterozygous hemoglobin variants (HbS, HgC, etc)do  not significantly interfere with this assay.   However, presence of multiple variants may affect accuracy.      Estimated Avg Glucose 10/18/2022 128  68 - 131 mg/dL Final    WBC 10/18/2022 6.08  3.90 - 12.70 K/uL Final    RBC 10/18/2022 3.74 (L)  4.00 - 5.40 M/uL Final    Hemoglobin 10/18/2022 10.4 (L)  12.0 - 16.0 g/dL Final    Hematocrit 10/18/2022 35.3 (L)  37.0 - 48.5 % Final    MCV 10/18/2022 94  82 - 98 fL Final    MCH 10/18/2022 27.8  27.0 - 31.0 pg Final    MCHC 10/18/2022 29.5 (L)  32.0 - 36.0 g/dL Final    RDW 10/18/2022 15.8 (H)  11.5 - 14.5 % Final    Platelets 10/18/2022 313  150 - 450 K/uL Final    MPV 10/18/2022 11.6  9.2 - 12.9 fL Final    Immature Granulocytes 10/18/2022 0.2  0.0 - 0.5 % Final    Gran # (ANC) 10/18/2022 3.6  1.8 - 7.7 K/uL Final    Immature Grans (Abs) 10/18/2022 0.01  0.00 - 0.04 K/uL Final    Comment: Mild elevation in immature granulocytes is non specific and   can be seen in a variety of conditions including stress response,   acute inflammation, trauma and pregnancy. Correlation with other   laboratory and clinical findings is  essential.      Lymph # 10/18/2022 1.7  1.0 - 4.8 K/uL Final    Mono # 10/18/2022 0.6  0.3 - 1.0 K/uL Final    Eos # 10/18/2022 0.2  0.0 - 0.5 K/uL Final    Baso # 10/18/2022 0.02  0.00 - 0.20 K/uL Final    nRBC 10/18/2022 0  0 /100 WBC Final    Gran % 10/18/2022 58.9  38.0 - 73.0 % Final    Lymph % 10/18/2022 27.6  18.0 - 48.0 % Final    Mono % 10/18/2022 9.2  4.0 - 15.0 % Final    Eosinophil % 10/18/2022 3.8  0.0 - 8.0 % Final    Basophil % 10/18/2022 0.3  0.0 - 1.9 % Final    Differential Method 10/18/2022 Automated   Final    Vit D, 25-Hydroxy 10/18/2022 25 (L)  30 - 96 ng/mL Final    Comment: Vitamin D deficiency.........<10 ng/mL                              Vitamin D insufficiency......10-29 ng/mL       Vitamin D sufficiency........> or equal to 30 ng/mL  Vitamin D toxicity............>100 ng/mL      Sodium 10/18/2022 138  136 - 145 mmol/L Final    Potassium 10/18/2022 4.1  3.5 - 5.1 mmol/L Final    Chloride 10/18/2022 106  95 - 110 mmol/L Final    CO2 10/18/2022 25  23 - 29 mmol/L Final    Glucose 10/18/2022 105  70 - 110 mg/dL Final    BUN 10/18/2022 8  6 - 20 mg/dL Final    Creatinine 10/18/2022 0.7  0.5 - 1.4 mg/dL Final    Calcium 10/18/2022 9.4  8.7 - 10.5 mg/dL Final    Total Protein 10/18/2022 7.1  6.0 - 8.4 g/dL Final    Albumin 10/18/2022 3.9  3.5 - 5.2 g/dL Final    Total Bilirubin 10/18/2022 0.4  0.1 - 1.0 mg/dL Final    Comment: For infants and newborns, interpretation of results should be based  on gestational age, weight and in agreement with clinical  observations.    Premature Infant recommended reference ranges:  Up to 24 hours.............<8.0 mg/dL  Up to 48 hours............<12.0 mg/dL  3-5 days..................<15.0 mg/dL  6-29 days.................<15.0 mg/dL      Alkaline Phosphatase 10/18/2022 109  55 - 135 U/L Final    AST 10/18/2022 19  10 - 40 U/L Final    ALT 10/18/2022 20  10 - 44 U/L Final    Anion Gap 10/18/2022 7 (L)  8 - 16 mmol/L Final    eGFR 10/18/2022 >60.0  >60  mL/min/1.73 m^2 Final     Narrative  Performed by: GEMUSE  Test Reason : Z01.818     Vent. Rate : 078 BPM     Atrial Rate : 078 BPM      P-R Int : 144 ms          QRS Dur : 072 ms       QT Int : 404 ms       P-R-T Axes : 062 004 020 degrees      QTc Int : 460 ms     Normal sinus rhythm   Normal ECG   No previous ECGs available   Confirmed by KARIN MORRIS MD ([id number]) on 10/18/2022 2:11:20 PM     Referred By: LILY RAM           Confirmed By:KARIN MORRIS MD      Specimen Collected: 10/18/22 11:45 Last Resulted: 10/18/22 14:11        Mammo Digital Screening Bilat w/ Anshul  Order: [id number]  Status: Final result     Visible to patient: Yes (seen)     Next appt: 10/26/2022 at 09:30 AM in Diabetes (Flavia Bruner NP)     Dx: Encounter for screening mammogram for...     0 Result Notes    1 Patient Communication    1 HM Topic  Assessment    Overall   1 - Negative     Mammography Recommendations     Side Due   Routine Screening Mammogram in 1 year  6/30/2023       Breast Density     Overall   Breast Composition c - Heterogeneously dense          Assessment:     1. Preop examination    2. Diabetic peripheral neuropathy associated with type 2 diabetes mellitus    3. Depression with anxiety      Plan:   Preop examination  -     Hemoglobin A1C; Future; Expected date: 10/18/2022  -     CBC Auto Differential; Future; Expected date: 10/18/2022  -     Vitamin D; Future; Expected date: 10/18/2022  -     Comprehensive Metabolic Panel; Future; Expected date: 10/18/2022  -     EKG 12-lead; Future    Diabetic peripheral neuropathy associated with type 2 diabetes mellitus  Pt requests brand name  States generic dose not work.   -     gabapentin (NEURONTIN) 300 MG capsule; Take 1 capsule (300 mg total) by mouth 2 (two) times daily.  Dispense: 180 capsule; Refill: 1    Depression with anxiety  Stable on fluoxetine  -     FLUoxetine 10 MG capsule; Take 1 capsule (10 mg total) by mouth once daily.  Dispense: 90 capsule; Refill: 0    Other  orders  -     traZODone (DESYREL) 50 MG tablet; Take 1 tablet (50 mg total) by mouth every evening.  Dispense: 90 tablet; Refill: 0    Ms. Shay is at low risk for a perioperative cardiac event for her abdominoplasty.     Lab Frequency Next Occurrence   Mammo Digital Screening Bilat w/ Anshul Once 11/01/2021   Ambulatory referral/consult to Psychiatry Once 11/08/2021   Ambulatory referral/consult to Psychiatry Once 11/08/2021   Ambulatory referral/consult to Optometry Once 11/08/2021   Ambulatory referral/consult to Gynecology Once 11/08/2021   Ambulatory referral/consult to Diabetes Education Once 11/08/2021   CBC Auto Differential Once 02/15/2022   Basic Metabolic Panel Once 02/15/2022   Microalbumin/Creatinine Ratio, Urine Once 02/15/2022   Ambulatory referral/consult to Endo Procedure  Once 04/20/2022       Problem List Items Addressed This Visit       Depression with anxiety    Relevant Medications    FLUoxetine 10 MG capsule    Diabetic peripheral neuropathy associated with type 2 diabetes mellitus    Relevant Medications    gabapentin (NEURONTIN) 300 MG capsule     Other Visit Diagnoses       Preop examination    -  Primary    Relevant Orders    Hemoglobin A1C (Completed)    CBC Auto Differential (Completed)    Vitamin D (Completed)    Comprehensive Metabolic Panel (Completed)    EKG 12-lead (Completed)            Follow up in about 6 months (around 4/18/2023), or if symptoms worsen or fail to improve.

## 2022-10-20 ENCOUNTER — TELEPHONE (OUTPATIENT)
Dept: INTERNAL MEDICINE | Facility: CLINIC | Age: 47
End: 2022-10-20
Payer: COMMERCIAL

## 2022-10-21 NOTE — TELEPHONE ENCOUNTER
Please send my last PN, labs, med list,  allergy list, ekg to surgeon.  Dr. Greg Anna fax [phone].

## 2022-10-26 ENCOUNTER — OFFICE VISIT (OUTPATIENT)
Dept: DIABETES | Facility: CLINIC | Age: 47
End: 2022-10-26
Payer: COMMERCIAL

## 2022-10-26 ENCOUNTER — TELEPHONE (OUTPATIENT)
Dept: DIABETES | Facility: CLINIC | Age: 47
End: 2022-10-26

## 2022-10-26 ENCOUNTER — PATIENT MESSAGE (OUTPATIENT)
Dept: DIABETES | Facility: CLINIC | Age: 47
End: 2022-10-26

## 2022-10-26 ENCOUNTER — PATIENT MESSAGE (OUTPATIENT)
Dept: INTERNAL MEDICINE | Facility: CLINIC | Age: 47
End: 2022-10-26
Payer: COMMERCIAL

## 2022-10-26 DIAGNOSIS — E11.3293 MILD NONPROLIFERATIVE DIABETIC RETINOPATHY OF BOTH EYES WITHOUT MACULAR EDEMA ASSOCIATED WITH TYPE 2 DIABETES MELLITUS: ICD-10-CM

## 2022-10-26 DIAGNOSIS — E11.65 UNCONTROLLED TYPE 2 DIABETES MELLITUS WITH HYPERGLYCEMIA, WITH LONG-TERM CURRENT USE OF INSULIN: Primary | ICD-10-CM

## 2022-10-26 DIAGNOSIS — E11.42 DIABETIC PERIPHERAL NEUROPATHY ASSOCIATED WITH TYPE 2 DIABETES MELLITUS: ICD-10-CM

## 2022-10-26 DIAGNOSIS — Z79.4 UNCONTROLLED TYPE 2 DIABETES MELLITUS WITH HYPERGLYCEMIA, WITH LONG-TERM CURRENT USE OF INSULIN: Primary | ICD-10-CM

## 2022-10-26 DIAGNOSIS — E55.9 VITAMIN D DEFICIENCY: ICD-10-CM

## 2022-10-26 PROCEDURE — 3044F HG A1C LEVEL LT 7.0%: CPT | Mod: CPTII,95,, | Performed by: NURSE PRACTITIONER

## 2022-10-26 PROCEDURE — 3044F PR MOST RECENT HEMOGLOBIN A1C LEVEL <7.0%: ICD-10-PCS | Mod: CPTII,95,, | Performed by: NURSE PRACTITIONER

## 2022-10-26 PROCEDURE — 99214 PR OFFICE/OUTPT VISIT, EST, LEVL IV, 30-39 MIN: ICD-10-PCS | Mod: 95,,, | Performed by: NURSE PRACTITIONER

## 2022-10-26 PROCEDURE — 1160F PR REVIEW ALL MEDS BY PRESCRIBER/CLIN PHARMACIST DOCUMENTED: ICD-10-PCS | Mod: CPTII,95,, | Performed by: NURSE PRACTITIONER

## 2022-10-26 PROCEDURE — 1160F RVW MEDS BY RX/DR IN RCRD: CPT | Mod: CPTII,95,, | Performed by: NURSE PRACTITIONER

## 2022-10-26 PROCEDURE — 99214 OFFICE O/P EST MOD 30 MIN: CPT | Mod: 95,,, | Performed by: NURSE PRACTITIONER

## 2022-10-26 PROCEDURE — 1159F PR MEDICATION LIST DOCUMENTED IN MEDICAL RECORD: ICD-10-PCS | Mod: CPTII,95,, | Performed by: NURSE PRACTITIONER

## 2022-10-26 PROCEDURE — 1159F MED LIST DOCD IN RCRD: CPT | Mod: CPTII,95,, | Performed by: NURSE PRACTITIONER

## 2022-10-26 RX ORDER — PEN NEEDLE, DIABETIC 30 GX3/16"
NEEDLE, DISPOSABLE MISCELLANEOUS
Qty: 200 EACH | Refills: 11 | Status: SHIPPED | OUTPATIENT
Start: 2022-10-26

## 2022-10-26 RX ORDER — EMPAGLIFLOZIN AND METFORMIN HYDROCHLORIDE 12.5; 1 MG/1; MG/1
1 TABLET ORAL DAILY
Qty: 30 TABLET | Refills: 11 | Status: SHIPPED | OUTPATIENT
Start: 2022-10-26 | End: 2022-12-19 | Stop reason: SINTOL

## 2022-10-26 RX ORDER — DULAGLUTIDE 1.5 MG/.5ML
1.5 INJECTION, SOLUTION SUBCUTANEOUS
Qty: 4 PEN | Refills: 11 | Status: SHIPPED | OUTPATIENT
Start: 2022-10-26 | End: 2023-02-20 | Stop reason: ALTCHOICE

## 2022-10-26 RX ORDER — METFORMIN HYDROCHLORIDE 500 MG/1
500 TABLET, EXTENDED RELEASE ORAL 2 TIMES DAILY WITH MEALS
Qty: 180 TABLET | Refills: 3 | Status: SHIPPED | OUTPATIENT
Start: 2022-10-26 | End: 2022-10-26 | Stop reason: ALTCHOICE

## 2022-10-26 RX ORDER — BLOOD-GLUCOSE SENSOR
EACH MISCELLANEOUS
Qty: 3 EACH | Refills: 11 | Status: SHIPPED | OUTPATIENT
Start: 2022-10-26 | End: 2023-11-15

## 2022-10-26 RX ORDER — INSULIN LISPRO 100 [IU]/ML
INJECTION, SOLUTION INTRAVENOUS; SUBCUTANEOUS
Qty: 15 ML | Refills: 11 | Status: SHIPPED | OUTPATIENT
Start: 2022-10-26 | End: 2023-11-15 | Stop reason: SDUPTHER

## 2022-10-26 RX ORDER — INSULIN GLARGINE 100 [IU]/ML
40 INJECTION, SOLUTION SUBCUTANEOUS DAILY
Qty: 15 ML | Refills: 11 | Status: SHIPPED | OUTPATIENT
Start: 2022-10-26 | End: 2023-11-15 | Stop reason: SDUPTHER

## 2022-10-26 RX ORDER — BLOOD-GLUCOSE TRANSMITTER
EACH MISCELLANEOUS
Qty: 1 EACH | Refills: 3 | Status: SHIPPED | OUTPATIENT
Start: 2022-10-26 | End: 2023-11-15

## 2022-10-26 NOTE — PROGRESS NOTES
Established Patient - Audio Only Telehealth Visit     The patient location is: [place]  The chief complaint leading to consultation is: diabetes management follow up   Visit type: Virtual visit with audio only (telephone)  Total time spent with patient: 15 minutes       The reason for the audio only service rather than synchronous audio and video virtual visit was related to technical difficulties or patient preference/necessity.     Each patient to whom I provide medical services by telemedicine is:  (1) informed of the relationship between the physician and patient and the respective role of any other health care provider with respect to management of the patient; and (2) notified that they may decline to receive medical services by telemedicine and may withdraw from such care at any time. Patient verbally consented to receive this service via voice-only telephone call.       This service was not originating from a related E/M service provided within the previous 7 days nor will  to an E/M service or procedure within the next 24 hours or my soonest available appointment.  Prevailing standard of care was able to be met in this audio-only visit.       Subjective:         Patient ID: Che Shay is a 47 y.o. female.  Patient's current PCP is Luis Alfredo Mckinney MD.     Chief Complaint: Diabetes Mellitus    HPI  Che Shay is a 47 y.o. Black or  female presenting for a follow up for diabetes. Patient has been diagnosed with type 2 diabetes since 2007 .    CURRENT DM MEDICATIONS:   Diabetes Medications               insulin (LANTUS SOLOSTAR U-100 INSULIN) glargine 100 units/mL (3mL) SubQ pen Inject 40 Units into the skin once daily.    insulin lispro (HUMALOG KWIKPEN INSULIN) 100 unit/mL pen Inject every 4 hours if needed to correct a high blood sugar.    metFORMIN (GLUCOPHAGE XR) 500 MG ER 24hr tablet Take 1 tablet (500 mg total) by mouth 2 (two) times daily with meals. No longer taking  since on Synjardy     SYNJARDY 12.5-1,000 mg Tab Take 1 tablet by mouth once daily.    TRULICITY 0.75 mg/0.5 mL pen injector Inject into the skin. Patient requesting an increase in dose            Past failed treatment include: None    Blood glucose testing is performed regularly. Patient is testing 1 times per day.  Meter: Reli On & Contour Next  Preferred lab: [facility]    Any episodes of hypoglycemia? Denies    Complications related to diabetes: retinopathy and peripheral neuropathy    Her blood sugar in the clinic today was:   Lab Results   Component Value Date    POCGLU 286 (A) 02/15/2022       Che Shay presents today for follow up visit to discuss diabetes management.  Ultimately changed to phone call visit due to patient preference. Reports doing well with stable Bgs that are at goal. This correlates with recent A1c of 6.1%. She is tolerating Trulicity well without GI side effects - requests an increase in dose. Needs refills authorized on all meds. Due for eye exam 12/2022 - reminded to complete this.     Neuropathy- On Gabapentin.    Vitamin D deficiency- Previously taking Vit D 25,000 International Units weekly but has been out. Vit D remains low, but improving- refill authorized.     Mild DR- 12/9/2021 last visit. As above, reminded to schedule.    Current diet:  Bfast- often skipped. Drinks protein shake occasionally. Snacks- candy,chips. Lunch-leftovers. Supper- cooked at home.   Activity Level: not active currently    Lab Results   Component Value Date    HGBA1C 6.1 (H) 10/18/2022    HGBA1C 8.1 (H) 02/15/2022    HGBA1C 9.0 (H) 11/05/2021       STANDARDS OF CARE  Diabetes Management Status    Statin: Not taking  ACE/ARB: Not taking    Screening or Prevention Patient's value Goal Complete/Controlled?   HgA1C Testing and Control   Lab Results   Component Value Date    HGBA1C 6.1 (H) 10/18/2022      Annually/Less than 8% Yes     Lipid profile : 11/05/2021 Annually Yes     LDL control Lab Results    Component Value Date    LDLCALC 76.8 11/05/2021    Annually/Less than 100 mg/dl  Yes     Nephropathy screening Lab Results   Component Value Date    LABMICR <2.5 03/20/2017     Lab Results   Component Value Date    PROTEINUA Negative 03/08/2022     Lab Results   Component Value Date    UTPCR 0.08 03/03/2016      Annually Yes     Blood pressure BP Readings from Last 1 Encounters:   10/18/22 126/78    Less than 140/90 Yes     Dilated retinal exam : 12/09/2021 Annually Yes     Foot exam   : 11/01/2021 Annually Yes          Labs reviewed and are noted below.    Lab Results   Component Value Date    WBC 6.08 10/18/2022    HGB 10.4 (L) 10/18/2022    HCT 35.3 (L) 10/18/2022     10/18/2022    CHOL 148 11/05/2021    TRIG 126 11/05/2021    HDL 46 11/05/2021    LDLCALC 76.8 11/05/2021    ALT 20 10/18/2022    AST 19 10/18/2022     10/18/2022    K 4.1 10/18/2022     10/18/2022    ANIONGAP 7 (L) 10/18/2022    CREATININE 0.7 10/18/2022    ESTGFRAFRICA >60.0 11/05/2021    EGFRNONAA >60.0 11/05/2021    BUN 8 10/18/2022    CO2 25 10/18/2022    TSH 1.848 11/05/2021     10/18/2022    UTPCR 0.08 03/03/2016     Lab Results   Component Value Date    GLUTAMICACID 0.00 11/21/2016    CPEPTIDE 2.0 11/21/2016    FREET4 1.35 10/21/2013    TSH 1.848 11/05/2021    CALCIUM 9.4 10/18/2022     Lab Results   Component Value Date    CPEPTIDE 2.0 11/21/2016     Lab Results   Component Value Date    GLUTAMICACID 0.00 11/21/2016     Glucose   Date Value Ref Range Status   10/18/2022 105 70 - 110 mg/dL Final     Anion Gap   Date Value Ref Range Status   10/18/2022 7 (L) 8 - 16 mmol/L Final     eGFR if    Date Value Ref Range Status   11/05/2021 >60.0 >60 mL/min/1.73 m^2 Final     eGFR if non    Date Value Ref Range Status   11/05/2021 >60.0 >60 mL/min/1.73 m^2 Final     Comment:     Calculation used to obtain the estimated glomerular filtration  rate (eGFR) is the CKD-EPI equation.          The  following portions of the patient's history were reviewed and updated as appropriate: allergies, current medications, past family history, past medical history, past social history, past surgical history and problem list.    Review of patient's allergies indicates:  No Known Allergies  Social History     Socioeconomic History    Marital status:     Number of children: 2   Occupational History    Occupation: LPN     Employer: [employer]     Comment: [employer]   Tobacco Use    Smoking status: Never    Smokeless tobacco: Never   Substance and Sexual Activity    Alcohol use: Yes     Alcohol/week: 0.0 standard drinks     Comment: occassionally    Drug use: No    Sexual activity: Yes     Partners: Male     Birth control/protection: None   Other Topics Concern    Are you pregnant or think you may be? No    Breast-feeding No   Social History Narrative    . LPN. Wears seatbelt.     Past Medical History:   Diagnosis Date    Depression with anxiety     GERD (gastroesophageal reflux disease)     IBS (irritable bowel syndrome)     Type II or unspecified type diabetes mellitus without mention of complication, uncontrolled     Vitamin D deficiency        REVIEW OF SYSTEMS:  Eyes Reports mild DR.  Cardiovascular: No history of HTN or hyperlipidemia.  GI: Tolerating Trulicity well without GI side effects   : No CKD.  Neuro: Positive neuropathy.  PSYCH: No tobacco use.  ENDO: See HPI.        Objective:      There were no vitals filed for this visit.  No physical exam-video visit today.  FOOT EXAMINATION: UTD  Assessment:       1. Uncontrolled type 2 diabetes mellitus with hyperglycemia, with long-term current use of insulin    2. Vitamin D deficiency    3. Diabetic peripheral neuropathy associated with type 2 diabetes mellitus    4. Mild nonproliferative diabetic retinopathy of both eyes without macular edema associated with type 2 diabetes mellitus          Plan:   Che was seen  "today for diabetes mellitus.    Diagnoses and all orders for this visit:    Uncontrolled type 2 diabetes mellitus with hyperglycemia, with long-term current use of insulin  -     LANTUS SOLOSTAR U-100 INSULIN glargine 100 units/mL SubQ pen; Inject 40 Units into the skin once daily.  -     insulin lispro (HUMALOG KWIKPEN INSULIN) 100 unit/mL pen; Inject every 4 hours if needed to correct a high blood sugar.  -     dulaglutide (TRULICITY) 1.5 mg/0.5 mL pen injector; Inject 1.5 mg into the skin every 7 days.  -     pen needle, diabetic ([employer] ULTRA-FINE CAMERON PEN NEEDLE) 32 gauge x 5/32" Ndle; Use with insulin 4-5 times daily  -     blood-glucose sensor (DEXCOM G6 SENSOR) Louise; Change sensor every 10 days  -     blood-glucose transmitter (DEXCOM G6 TRANSMITTER) Louise; Change sensor every 90 days  -     SYNJARDY 12.5-1,000 mg Tab; Take 1 tablet by mouth once daily.    -- Medications adjusted for today's visit include:    Continue Lantus 40 units once daily.    Increase Trulicity 1.5 mg once a week.     Continue Synjardy once daily.    Take Humalog 3 times a day as needed per sliding scale:     160-200: 2 units  201-240: 4 units  241-280: 6 units  >280: 8 units    Vitamin D deficiency  -     cholecalciferol, vitamin D3, (DECARA) 625 mcg (25,000 unit) Cap capsule; Take 1 capsule (25,000 Units total) by mouth once a week.    Re-start weekly supplementation.    Diabetic peripheral neuropathy associated with type 2 diabetes mellitus-Chronic,stable    -Continue Gabapentin.    Mild nonproliferative diabetic retinopathy of both eyes without macular edema associated with type 2 diabetes mellitus-Chronic,stable    -Follow up with ophthalmology as advised.    - Follow up: 3 months    Portions of this note may have been created with voice recognition software. Occasional "wrong-word" or "sound-a-like" substitutions may have occurred due to the inherent limitations of voice recognition software. Please, read the note carefully and " recognize, using context, where substitutions have occurred.             Flavia Bruner,ESTIVENP-C  [facility]

## 2022-10-26 NOTE — PATIENT INSTRUCTIONS
-- Medications adjusted for today's visit include:    Continue Lantus 40 units once daily.    Increase Trulicity 1.5 mg once a week.     Continue Synjardy once daily.    Take Humalog 3 times a day as needed per sliding scale:     160-200: 2 units  201-240: 4 units  241-280: 6 units  >280: 8 units    -- Limit carbs to no more than 30-45 grams with each meal. Never eat carbs by themselves, always add protein. Make snacks low carb or non-carb only.    -- Start checking blood sugar 3 times daily: Fasting blood sugar and vary your next 2 readings: Before lunch, before supper, 2 hours after any meal, or bedtime.   -Blood sugar goals should be a fasting blood sugar between , and no blood sugars throughout the day over 180 is good, less than 160 better, less than 140 perfect.    --Carry glucose tablets/soft peppermints/regular juice or Coke product with you at all times to treat a low blood sugar episode (less than 70). If your blood sugar is between 50-70, Chew 4 tablets or drink 1/2 cup of juice or regular Coke product. If your blood sugar is below 50, double the treatment. Re-check blood sugar in 15 minutes. If still low, repeat this. Always call the clinic to give an update for any low blood sugar episodes.    --Exercise as tolerated: Goal 30 minutes/day, 5 days/week. Start slowly and increase as tolerated.    --Follow-up for your next visit in 3 months.     --Please either drop off, fax, or MyChart your readings to me as needed.

## 2022-11-09 DIAGNOSIS — E11.9 TYPE 2 DIABETES MELLITUS WITHOUT COMPLICATION: ICD-10-CM

## 2022-12-19 ENCOUNTER — PATIENT MESSAGE (OUTPATIENT)
Dept: DIABETES | Facility: CLINIC | Age: 47
End: 2022-12-19
Payer: COMMERCIAL

## 2022-12-19 DIAGNOSIS — Z79.4 UNCONTROLLED TYPE 2 DIABETES MELLITUS WITH HYPERGLYCEMIA, WITH LONG-TERM CURRENT USE OF INSULIN: Primary | ICD-10-CM

## 2022-12-19 DIAGNOSIS — E11.65 UNCONTROLLED TYPE 2 DIABETES MELLITUS WITH HYPERGLYCEMIA, WITH LONG-TERM CURRENT USE OF INSULIN: Primary | ICD-10-CM

## 2022-12-19 RX ORDER — METFORMIN HYDROCHLORIDE 500 MG/1
1000 TABLET, EXTENDED RELEASE ORAL
Qty: 180 TABLET | Refills: 3 | Status: SHIPPED | OUTPATIENT
Start: 2022-12-19 | End: 2023-11-15 | Stop reason: SDUPTHER

## 2023-01-05 ENCOUNTER — PATIENT OUTREACH (OUTPATIENT)
Dept: ADMINISTRATIVE | Facility: HOSPITAL | Age: 48
End: 2023-01-05
Payer: COMMERCIAL

## 2023-01-06 ENCOUNTER — PATIENT OUTREACH (OUTPATIENT)
Dept: ADMINISTRATIVE | Facility: HOSPITAL | Age: 48
End: 2023-01-06
Payer: COMMERCIAL

## 2023-01-25 ENCOUNTER — PATIENT MESSAGE (OUTPATIENT)
Dept: ADMINISTRATIVE | Facility: HOSPITAL | Age: 48
End: 2023-01-25
Payer: COMMERCIAL

## 2023-01-25 ENCOUNTER — PATIENT OUTREACH (OUTPATIENT)
Dept: ADMINISTRATIVE | Facility: HOSPITAL | Age: 48
End: 2023-01-25
Payer: COMMERCIAL

## 2023-01-25 NOTE — LETTER
January 25, 2023      We are seeing Che Shay, 1975, at [facility]. Luis Alfredo Mckinney MD is their primary care physician. To help with our thiago maintenance records could you please send the following:     Diabetic Eye Exam    Please fax to [facility] at [phone], attention Care Coordination Department.     Thank-you in advance for your assistance. If you have any questions or concerns, please contact me at  [phone].       Shanel Pollock MACC [facility]

## 2023-02-20 ENCOUNTER — PATIENT MESSAGE (OUTPATIENT)
Dept: DIABETES | Facility: CLINIC | Age: 48
End: 2023-02-20
Payer: COMMERCIAL

## 2023-02-20 DIAGNOSIS — E11.65 UNCONTROLLED TYPE 2 DIABETES MELLITUS WITH HYPERGLYCEMIA, WITH LONG-TERM CURRENT USE OF INSULIN: Primary | ICD-10-CM

## 2023-02-20 DIAGNOSIS — Z79.4 UNCONTROLLED TYPE 2 DIABETES MELLITUS WITH HYPERGLYCEMIA, WITH LONG-TERM CURRENT USE OF INSULIN: Primary | ICD-10-CM

## 2023-02-20 RX ORDER — SEMAGLUTIDE 1.34 MG/ML
0.5 INJECTION, SOLUTION SUBCUTANEOUS
Qty: 1 PEN | Refills: 5 | Status: SHIPPED | OUTPATIENT
Start: 2023-02-20 | End: 2023-05-22

## 2023-02-21 ENCOUNTER — PATIENT OUTREACH (OUTPATIENT)
Dept: ADMINISTRATIVE | Facility: HOSPITAL | Age: 48
End: 2023-02-21
Payer: COMMERCIAL

## 2023-02-21 NOTE — PROGRESS NOTES
Working DM Eye Report:     Patient overdue for DM eye exam.  Spoke with pt,scheduled with Dr. Blackman on 04/04/2023

## 2023-04-19 ENCOUNTER — PATIENT MESSAGE (OUTPATIENT)
Dept: ADMINISTRATIVE | Facility: HOSPITAL | Age: 48
End: 2023-04-19
Payer: COMMERCIAL

## 2023-04-19 DIAGNOSIS — E11.9 TYPE 2 DIABETES MELLITUS WITHOUT COMPLICATION: ICD-10-CM

## 2023-05-17 DIAGNOSIS — B37.31 VAGINAL CANDIDIASIS: ICD-10-CM

## 2023-05-17 RX ORDER — FLUCONAZOLE 150 MG/1
TABLET ORAL
Qty: 2 TABLET | Refills: 0 | Status: SHIPPED | OUTPATIENT
Start: 2023-05-17 | End: 2023-11-15

## 2023-05-21 ENCOUNTER — PATIENT MESSAGE (OUTPATIENT)
Dept: DIABETES | Facility: CLINIC | Age: 48
End: 2023-05-21
Payer: COMMERCIAL

## 2023-05-21 DIAGNOSIS — E11.65 UNCONTROLLED TYPE 2 DIABETES MELLITUS WITH HYPERGLYCEMIA, WITH LONG-TERM CURRENT USE OF INSULIN: Primary | ICD-10-CM

## 2023-05-21 DIAGNOSIS — Z79.4 UNCONTROLLED TYPE 2 DIABETES MELLITUS WITH HYPERGLYCEMIA, WITH LONG-TERM CURRENT USE OF INSULIN: Primary | ICD-10-CM

## 2023-05-22 ENCOUNTER — TELEPHONE (OUTPATIENT)
Dept: DIABETES | Facility: CLINIC | Age: 48
End: 2023-05-22
Payer: COMMERCIAL

## 2023-05-22 RX ORDER — SEMAGLUTIDE 1.34 MG/ML
1 INJECTION, SOLUTION SUBCUTANEOUS
Qty: 3 ML | Refills: 5 | Status: SHIPPED | OUTPATIENT
Start: 2023-05-22 | End: 2023-11-15 | Stop reason: SDUPTHER

## 2023-05-25 ENCOUNTER — TELEPHONE (OUTPATIENT)
Dept: DIABETES | Facility: CLINIC | Age: 48
End: 2023-05-25
Payer: COMMERCIAL

## 2023-05-25 NOTE — TELEPHONE ENCOUNTER
PA initiated for ozempic. [facility] [id number]. Sent to KARIN. An active PA is already on file with expiration date of 05/21/2024

## 2023-09-28 ENCOUNTER — TELEPHONE (OUTPATIENT)
Dept: DIABETES | Facility: CLINIC | Age: 48
End: 2023-09-28
Payer: COMMERCIAL

## 2023-11-15 ENCOUNTER — LAB VISIT (OUTPATIENT)
Dept: LAB | Facility: HOSPITAL | Age: 48
End: 2023-11-15
Attending: FAMILY MEDICINE
Payer: COMMERCIAL

## 2023-11-15 ENCOUNTER — OFFICE VISIT (OUTPATIENT)
Dept: INTERNAL MEDICINE | Facility: CLINIC | Age: 48
End: 2023-11-15
Payer: COMMERCIAL

## 2023-11-15 VITALS
DIASTOLIC BLOOD PRESSURE: 70 MMHG | TEMPERATURE: 97 F | HEART RATE: 96 BPM | SYSTOLIC BLOOD PRESSURE: 138 MMHG | WEIGHT: 212.31 LBS | HEIGHT: 68 IN | OXYGEN SATURATION: 98 % | BODY MASS INDEX: 32.18 KG/M2

## 2023-11-15 DIAGNOSIS — R05.9 COUGH, UNSPECIFIED TYPE: Primary | ICD-10-CM

## 2023-11-15 DIAGNOSIS — E55.9 VITAMIN D DEFICIENCY: ICD-10-CM

## 2023-11-15 DIAGNOSIS — Z79.4 UNCONTROLLED TYPE 2 DIABETES MELLITUS WITH HYPERGLYCEMIA, WITH LONG-TERM CURRENT USE OF INSULIN: ICD-10-CM

## 2023-11-15 DIAGNOSIS — E11.65 UNCONTROLLED TYPE 2 DIABETES MELLITUS WITH HYPERGLYCEMIA, WITH LONG-TERM CURRENT USE OF INSULIN: ICD-10-CM

## 2023-11-15 DIAGNOSIS — F41.8 DEPRESSION WITH ANXIETY: ICD-10-CM

## 2023-11-15 DIAGNOSIS — R14.0 ABDOMINAL BLOATING: ICD-10-CM

## 2023-11-15 LAB
25(OH)D3+25(OH)D2 SERPL-MCNC: 23 NG/ML (ref 30–96)
ALBUMIN SERPL BCP-MCNC: 3.6 G/DL (ref 3.5–5.2)
ALP SERPL-CCNC: 110 U/L (ref 55–135)
ALT SERPL W/O P-5'-P-CCNC: 14 U/L (ref 10–44)
ANION GAP SERPL CALC-SCNC: 9 MMOL/L (ref 8–16)
AST SERPL-CCNC: 17 U/L (ref 10–40)
BASOPHILS # BLD AUTO: 0.03 K/UL (ref 0–0.2)
BASOPHILS NFR BLD: 0.4 % (ref 0–1.9)
BILIRUB SERPL-MCNC: 0.1 MG/DL (ref 0.1–1)
BUN SERPL-MCNC: 13 MG/DL (ref 6–20)
CALCIUM SERPL-MCNC: 9.4 MG/DL (ref 8.7–10.5)
CHLORIDE SERPL-SCNC: 105 MMOL/L (ref 95–110)
CHOLEST SERPL-MCNC: 141 MG/DL (ref 120–199)
CHOLEST/HDLC SERPL: 3.5 {RATIO} (ref 2–5)
CO2 SERPL-SCNC: 25 MMOL/L (ref 23–29)
CREAT SERPL-MCNC: 0.8 MG/DL (ref 0.5–1.4)
CTP QC/QA: YES
CTP QC/QA: YES
DIFFERENTIAL METHOD: ABNORMAL
EOSINOPHIL # BLD AUTO: 0.2 K/UL (ref 0–0.5)
EOSINOPHIL NFR BLD: 3 % (ref 0–8)
ERYTHROCYTE [DISTWIDTH] IN BLOOD BY AUTOMATED COUNT: 16.8 % (ref 11.5–14.5)
EST. GFR  (NO RACE VARIABLE): >60 ML/MIN/1.73 M^2
ESTIMATED AVG GLUCOSE: 189 MG/DL (ref 68–131)
GLUCOSE SERPL-MCNC: 228 MG/DL (ref 70–110)
HBA1C MFR BLD: 8.2 % (ref 4–5.6)
HCT VFR BLD AUTO: 28.9 % (ref 37–48.5)
HDLC SERPL-MCNC: 40 MG/DL (ref 40–75)
HDLC SERPL: 28.4 % (ref 20–50)
HGB BLD-MCNC: 8.5 G/DL (ref 12–16)
IMM GRANULOCYTES # BLD AUTO: 0.02 K/UL (ref 0–0.04)
IMM GRANULOCYTES NFR BLD AUTO: 0.3 % (ref 0–0.5)
LDLC SERPL CALC-MCNC: 79.6 MG/DL (ref 63–159)
LYMPHOCYTES # BLD AUTO: 1.4 K/UL (ref 1–4.8)
LYMPHOCYTES NFR BLD: 18 % (ref 18–48)
MCH RBC QN AUTO: 23.6 PG (ref 27–31)
MCHC RBC AUTO-ENTMCNC: 29.4 G/DL (ref 32–36)
MCV RBC AUTO: 80 FL (ref 82–98)
MONOCYTES # BLD AUTO: 0.7 K/UL (ref 0.3–1)
MONOCYTES NFR BLD: 9.3 % (ref 4–15)
NEUTROPHILS # BLD AUTO: 5.2 K/UL (ref 1.8–7.7)
NEUTROPHILS NFR BLD: 69 % (ref 38–73)
NONHDLC SERPL-MCNC: 101 MG/DL
NRBC BLD-RTO: 0 /100 WBC
PLATELET # BLD AUTO: 276 K/UL (ref 150–450)
PMV BLD AUTO: 13.2 FL (ref 9.2–12.9)
POC MOLECULAR INFLUENZA A AGN: NEGATIVE
POC MOLECULAR INFLUENZA B AGN: NEGATIVE
POTASSIUM SERPL-SCNC: 4.2 MMOL/L (ref 3.5–5.1)
PROT SERPL-MCNC: 6.9 G/DL (ref 6–8.4)
RBC # BLD AUTO: 3.6 M/UL (ref 4–5.4)
SARS-COV-2 RDRP RESP QL NAA+PROBE: NEGATIVE
SODIUM SERPL-SCNC: 139 MMOL/L (ref 136–145)
TRIGL SERPL-MCNC: 107 MG/DL (ref 30–150)
TSH SERPL DL<=0.005 MIU/L-ACNC: 1.2 UIU/ML (ref 0.4–4)
WBC # BLD AUTO: 7.55 K/UL (ref 3.9–12.7)

## 2023-11-15 PROCEDURE — 99999 PR PBB SHADOW E&M-EST. PATIENT-LVL III: ICD-10-PCS | Mod: PBBFAC,,, | Performed by: FAMILY MEDICINE

## 2023-11-15 PROCEDURE — 80061 LIPID PANEL: CPT | Performed by: FAMILY MEDICINE

## 2023-11-15 PROCEDURE — 82306 VITAMIN D 25 HYDROXY: CPT | Performed by: FAMILY MEDICINE

## 2023-11-15 PROCEDURE — 87635 SARS-COV-2 COVID-19 AMP PRB: CPT | Mod: QW,S$GLB,, | Performed by: FAMILY MEDICINE

## 2023-11-15 PROCEDURE — 99999 PR PBB SHADOW E&M-EST. PATIENT-LVL III: CPT | Mod: PBBFAC,,, | Performed by: FAMILY MEDICINE

## 2023-11-15 PROCEDURE — 85025 COMPLETE CBC W/AUTO DIFF WBC: CPT | Performed by: FAMILY MEDICINE

## 2023-11-15 PROCEDURE — 3078F DIAST BP <80 MM HG: CPT | Mod: CPTII,S$GLB,, | Performed by: FAMILY MEDICINE

## 2023-11-15 PROCEDURE — 83036 HEMOGLOBIN GLYCOSYLATED A1C: CPT | Performed by: FAMILY MEDICINE

## 2023-11-15 PROCEDURE — 3075F PR MOST RECENT SYSTOLIC BLOOD PRESS GE 130-139MM HG: ICD-10-PCS | Mod: CPTII,S$GLB,, | Performed by: FAMILY MEDICINE

## 2023-11-15 PROCEDURE — 36415 COLL VENOUS BLD VENIPUNCTURE: CPT | Mod: PO | Performed by: FAMILY MEDICINE

## 2023-11-15 PROCEDURE — 87502 INFLUENZA DNA AMP PROBE: CPT | Mod: QW,S$GLB,, | Performed by: FAMILY MEDICINE

## 2023-11-15 PROCEDURE — 84443 ASSAY THYROID STIM HORMONE: CPT | Performed by: FAMILY MEDICINE

## 2023-11-15 PROCEDURE — 80053 COMPREHEN METABOLIC PANEL: CPT | Performed by: FAMILY MEDICINE

## 2023-11-15 PROCEDURE — 87635: ICD-10-PCS | Mod: QW,S$GLB,, | Performed by: FAMILY MEDICINE

## 2023-11-15 PROCEDURE — 3078F PR MOST RECENT DIASTOLIC BLOOD PRESSURE < 80 MM HG: ICD-10-PCS | Mod: CPTII,S$GLB,, | Performed by: FAMILY MEDICINE

## 2023-11-15 PROCEDURE — 87502 POCT INFLUENZA A/B MOLECULAR: ICD-10-PCS | Mod: QW,S$GLB,, | Performed by: FAMILY MEDICINE

## 2023-11-15 PROCEDURE — 3008F BODY MASS INDEX DOCD: CPT | Mod: CPTII,S$GLB,, | Performed by: FAMILY MEDICINE

## 2023-11-15 PROCEDURE — 99214 OFFICE O/P EST MOD 30 MIN: CPT | Mod: S$GLB,,, | Performed by: FAMILY MEDICINE

## 2023-11-15 PROCEDURE — 99214 PR OFFICE/OUTPT VISIT, EST, LEVL IV, 30-39 MIN: ICD-10-PCS | Mod: S$GLB,,, | Performed by: FAMILY MEDICINE

## 2023-11-15 PROCEDURE — 3008F PR BODY MASS INDEX (BMI) DOCUMENTED: ICD-10-PCS | Mod: CPTII,S$GLB,, | Performed by: FAMILY MEDICINE

## 2023-11-15 PROCEDURE — 3075F SYST BP GE 130 - 139MM HG: CPT | Mod: CPTII,S$GLB,, | Performed by: FAMILY MEDICINE

## 2023-11-15 RX ORDER — IPRATROPIUM BROMIDE 21 UG/1
2 SPRAY, METERED NASAL 2 TIMES DAILY
Qty: 30 ML | Refills: 0 | Status: SHIPPED | OUTPATIENT
Start: 2023-11-15

## 2023-11-15 RX ORDER — METFORMIN HYDROCHLORIDE 500 MG/1
1000 TABLET, EXTENDED RELEASE ORAL
Qty: 180 TABLET | Refills: 0 | Status: SHIPPED | OUTPATIENT
Start: 2023-11-15

## 2023-11-15 RX ORDER — DICYCLOMINE HYDROCHLORIDE 20 MG/1
20 TABLET ORAL 3 TIMES DAILY PRN
Qty: 90 TABLET | Refills: 0 | Status: SHIPPED | OUTPATIENT
Start: 2023-11-15

## 2023-11-15 RX ORDER — SEMAGLUTIDE 1.34 MG/ML
1 INJECTION, SOLUTION SUBCUTANEOUS
Qty: 3 ML | Refills: 0 | Status: SHIPPED | OUTPATIENT
Start: 2023-11-15

## 2023-11-15 RX ORDER — FLUOXETINE HYDROCHLORIDE 20 MG/1
20 CAPSULE ORAL DAILY
Qty: 90 CAPSULE | Refills: 0 | Status: SHIPPED | OUTPATIENT
Start: 2023-11-15 | End: 2024-11-14

## 2023-11-15 RX ORDER — PREGABALIN 300 MG/1
300 CAPSULE ORAL 2 TIMES DAILY
Qty: 60 CAPSULE | Refills: 0 | Status: SHIPPED | OUTPATIENT
Start: 2023-11-15

## 2023-11-15 RX ORDER — CODEINE PHOSPHATE AND GUAIFENESIN 10; 100 MG/5ML; MG/5ML
5 SOLUTION ORAL 3 TIMES DAILY PRN
Qty: 120 ML | Refills: 0 | Status: SHIPPED | OUTPATIENT
Start: 2023-11-15 | End: 2023-11-25

## 2023-11-15 RX ORDER — TRAZODONE HYDROCHLORIDE 50 MG/1
50 TABLET ORAL NIGHTLY
Qty: 90 TABLET | Refills: 0 | Status: SHIPPED | OUTPATIENT
Start: 2023-11-15

## 2023-11-15 RX ORDER — INSULIN GLARGINE 100 [IU]/ML
40 INJECTION, SOLUTION SUBCUTANEOUS DAILY
Qty: 15 ML | Refills: 0 | Status: SHIPPED | OUTPATIENT
Start: 2023-11-15

## 2023-11-15 RX ORDER — BENZONATATE 200 MG/1
200 CAPSULE ORAL 3 TIMES DAILY PRN
Qty: 30 CAPSULE | Refills: 1 | Status: SHIPPED | OUTPATIENT
Start: 2023-11-15

## 2023-11-15 RX ORDER — INSULIN LISPRO 100 [IU]/ML
INJECTION, SOLUTION INTRAVENOUS; SUBCUTANEOUS
Qty: 15 ML | Refills: 0 | Status: SHIPPED | OUTPATIENT
Start: 2023-11-15

## 2023-11-15 NOTE — PROGRESS NOTES
Subjective:      Patient ID: Che Shay is a 48 y.o. female.    Chief Complaint: Cough, Headache, and Nasal Congestion      Patient here to establish care.  It has been sometime since she has seen her PCP, has been out of many of her medications.  Her last A1c was about a year ago, controlled, Ozempic working well for her.  She does report recent headache, congestion, coughing, postnasal drainage for about 3 days now.  She has tried using TheraFlu and Tylenol cold and sinus with minimal relief.    Cough  Associated symptoms include headaches.   Headache   Associated symptoms include coughing. Pertinent negatives include no abdominal pain.     Review of Systems   Constitutional:  Positive for fatigue. Negative for appetite change.   HENT:  Positive for congestion.    Respiratory:  Positive for cough.    Cardiovascular:  Negative for leg swelling.   Gastrointestinal:  Negative for abdominal pain.   Neurological:  Positive for headaches.     Past Medical History:   Diagnosis Date    Depression with anxiety     GERD (gastroesophageal reflux disease)     IBS (irritable bowel syndrome)     Type II or unspecified type diabetes mellitus without mention of complication, uncontrolled     Vitamin D deficiency           Past Surgical History:   Procedure Laterality Date    ABDOMINAL SURGERY      tummy tuck 10/2022    BREAST SURGERY  2000    Reduction     SECTION, LOW TRANSVERSE      x 2    CHOLECYSTECTOMY  2013    Right ankle surgery  2015    TOTAL REDUCTION MAMMOPLASTY Bilateral      Family History   Problem Relation Age of Onset    Diabetes Mother     Hypertension Mother     Glaucoma Father     Cancer Paternal Aunt     Stroke Paternal Aunt     Hypertension Paternal Aunt     Hypertension Paternal Uncle     Cancer Paternal Grandmother     Cancer Paternal Grandfather     No Known Problems Sister     No Known Problems Brother     No Known Problems Daughter     No Known Problems Daughter     No  "Known Problems Brother     Heart disease Neg Hx     Melanoma Neg Hx     Psoriasis Neg Hx     Lupus Neg Hx      Social History     Socioeconomic History    Marital status:     Number of children: 2   Occupational History    Occupation: LPN     Employer: [employer]     Comment: [employer]   Tobacco Use    Smoking status: Never    Smokeless tobacco: Never   Substance and Sexual Activity    Alcohol use: Yes     Alcohol/week: 0.0 standard drinks of alcohol     Comment: occassionally    Drug use: No    Sexual activity: Yes     Partners: Male     Birth control/protection: None   Other Topics Concern    Are you pregnant or think you may be? No    Breast-feeding No   Social History Narrative    . LPN. Wears seatbelt.     Review of patient's allergies indicates:  No Known Allergies    Objective:       /70 (BP Location: Left arm, Patient Position: Sitting, BP Method: Large (Manual))   Pulse 96   Temp 96.9 °F (36.1 °C) (Tympanic)   Ht 5' 8" (1.727 m)   Wt 96.3 kg (212 lb 4.9 oz)   SpO2 98%   BMI 32.28 kg/m²   Physical Exam  Vitals reviewed.   Constitutional:       General: She is not in acute distress.     Appearance: Normal appearance. She is well-developed. She is not ill-appearing or diaphoretic.   HENT:      Head: Normocephalic and atraumatic.      Right Ear: Hearing, tympanic membrane, ear canal and external ear normal.      Left Ear: Hearing, tympanic membrane, ear canal and external ear normal.      Nose: Mucosal edema present.      Right Sinus: No maxillary sinus tenderness or frontal sinus tenderness.      Left Sinus: No maxillary sinus tenderness or frontal sinus tenderness.      Mouth/Throat:      Pharynx: Uvula midline. Posterior oropharyngeal erythema present. No oropharyngeal exudate.   Eyes:      Conjunctiva/sclera: Conjunctivae normal.      Pupils: Pupils are equal, round, and reactive to light.   Neck:      Thyroid: No thyromegaly.      Trachea: No " tracheal deviation.   Cardiovascular:      Rate and Rhythm: Normal rate and regular rhythm.      Heart sounds: Normal heart sounds. No murmur heard.  Pulmonary:      Effort: Pulmonary effort is normal. No respiratory distress.      Breath sounds: Normal breath sounds.   Abdominal:      General: Bowel sounds are normal.      Palpations: Abdomen is soft.      Tenderness: There is no abdominal tenderness. There is no guarding.      Hernia: No hernia is present.   Musculoskeletal:         General: Normal range of motion.      Cervical back: Normal range of motion and neck supple.   Lymphadenopathy:      Cervical: No cervical adenopathy.   Skin:     General: Skin is warm and dry.      Capillary Refill: Capillary refill takes less than 2 seconds.   Neurological:      General: No focal deficit present.      Mental Status: She is alert and oriented to person, place, and time.   Psychiatric:         Mood and Affect: Mood normal.         Behavior: Behavior normal.         Thought Content: Thought content normal.         Judgment: Judgment normal.       Assessment:     1. Cough, unspecified type    2. Uncontrolled type 2 diabetes mellitus with hyperglycemia, with long-term current use of insulin    3. Vitamin D deficiency    4. Depression with anxiety    5. Abdominal bloating      Plan:   Cough, unspecified type  -     POCT Influenza A/B Molecular  -     POCT COVID-19 Rapid Screening    Uncontrolled type 2 diabetes mellitus with hyperglycemia, with long-term current use of insulin  -     Comprehensive Metabolic Panel; Future; Expected date: 11/15/2023  -     Hemoglobin A1C; Future; Expected date: 11/15/2023  -     CBC Auto Differential; Future; Expected date: 11/15/2023  -     Lipid Panel; Future; Expected date: 11/15/2023  -     TSH; Future; Expected date: 11/15/2023  -     semaglutide (OZEMPIC) 1 mg/dose (4 mg/3 mL); Inject 1 mg into the skin every 7 days.  Dispense: 3 mL; Refill: 0  -     metFORMIN (GLUCOPHAGE-XR) 500 MG ER  24hr tablet; Take 2 tablets (1,000 mg total) by mouth daily with breakfast.  Dispense: 180 tablet; Refill: 0  -     LANTUS SOLOSTAR U-100 INSULIN glargine 100 units/mL SubQ pen; Inject 40 Units into the skin once daily.  Dispense: 15 mL; Refill: 0  -     insulin lispro (HUMALOG KWIKPEN INSULIN) 100 unit/mL pen; Inject every 4 hours if needed to correct a high blood sugar.  Dispense: 15 mL; Refill: 0  -     Vitamin D; Future; Expected date: 11/15/2023    Vitamin D deficiency  -     Vitamin D; Future; Expected date: 11/15/2023  -     cholecalciferol, vitamin D3, (DECARA) 625 mcg (25,000 unit) Cap capsule; Take 1 capsule (25,000 Units total) by mouth once a week.  Dispense: 12 capsule; Refill: 3    Depression with anxiety    Abdominal bloating  -     dicyclomine (BENTYL) 20 mg tablet; Take 1 tablet (20 mg total) by mouth 3 (three) times daily as needed (abdominal pain).  Dispense: 90 tablet; Refill: 0    Other orders  -     pregabalin (LYRICA) 300 MG Cap; Take 1 capsule (300 mg total) by mouth 2 (two) times daily.  Dispense: 60 capsule; Refill: 0  -     traZODone (DESYREL) 50 MG tablet; Take 1 tablet (50 mg total) by mouth every evening.  Dispense: 90 tablet; Refill: 0  -     FLUoxetine 20 MG capsule; Take 1 capsule (20 mg total) by mouth once daily.  Dispense: 90 capsule; Refill: 0  -     ipratropium (ATROVENT) 21 mcg (0.03 %) nasal spray; 2 sprays by Each Nostril route 2 (two) times daily.  Dispense: 30 mL; Refill: 0  -     benzonatate (TESSALON) 200 MG capsule; Take 1 capsule (200 mg total) by mouth 3 (three) times daily as needed for Cough.  Dispense: 30 capsule; Refill: 1  -     guaiFENesin-codeine 100-10 mg/5 ml (TUSSI-ORGANIDIN NR)  mg/5 mL syrup; Take 5 mLs by mouth 3 (three) times daily as needed for Cough.  Dispense: 120 mL; Refill: 0    COVID and flu test today are negative-do believe she has a viral respiratory infection  Will have above labs drawn today, plan follow-up with me to discuss in a couple  "of weeks  Medication List with Changes/Refills   New Medications    BENZONATATE (TESSALON) 200 MG CAPSULE    Take 1 capsule (200 mg total) by mouth 3 (three) times daily as needed for Cough.    FLUOXETINE 20 MG CAPSULE    Take 1 capsule (20 mg total) by mouth once daily.    GUAIFENESIN-CODEINE 100-10 MG/5 ML (TUSSI-ORGANIDIN NR)  MG/5 ML SYRUP    Take 5 mLs by mouth 3 (three) times daily as needed for Cough.    IPRATROPIUM (ATROVENT) 21 MCG (0.03 %) NASAL SPRAY    2 sprays by Each Nostril route 2 (two) times daily.    PREGABALIN (LYRICA) 300 MG CAP    Take 1 capsule (300 mg total) by mouth 2 (two) times daily.   Current Medications    BIMATOPROST (LATISSE) 0.03 % OPHTHALMIC SOLUTION    APPLY TO EYELID MARGINS ONCE DAILY AS NEEDED    BLOOD SUGAR DIAGNOSTIC STRP    Check blood sugar 3 times daily. Please dispense test strips that are approved on her plan.    BLOOD-GLUCOSE METER KIT    Please dispense the meter that is preferred on her plan. Use as instructed    CHLORHEXIDINE (PERIDEX) 0.12 % SOLUTION    15 mLs 2 (two) times daily.    LANCETS MISC    Check blood sugar 3 times daily. Please dispense lancets that are approved on her insurance.    MULTIVIT-MINERALS/FOLIC ACID (ADULT MULTIVITAMIN GUMMIES ORAL)    Take by mouth as needed.    NYSTATIN (MYCOSTATIN) POWDER    Apply to affected area 3 times daily    PANTOPRAZOLE (PROTONIX) 40 MG TABLET    Take 1 tablet (40 mg total) by mouth 2 (two) times daily. for 14 days    PEN NEEDLE, DIABETIC (BD ULTRA-FINE CAMERON PEN NEEDLE) 32 GAUGE X 5/32" NDLE    Use with insulin 4-5 times daily    SPIRONOLACTONE (ALDACTONE) 100 MG TABLET    Take 100 mg by mouth 2 (two) times daily.   Changed and/or Refilled Medications    Modified Medication Previous Medication    CHOLECALCIFEROL, VITAMIN D3, (DECARA) 625 MCG (25,000 UNIT) CAP CAPSULE cholecalciferol, vitamin D3, (DECARA) 625 mcg (25,000 unit) Cap capsule       Take 1 capsule (25,000 Units total) by mouth once a week.    Take 1 " capsule (25,000 Units total) by mouth once a week.    DICYCLOMINE (BENTYL) 20 MG TABLET dicyclomine (BENTYL) 20 mg tablet       Take 1 tablet (20 mg total) by mouth 3 (three) times daily as needed (abdominal pain).    Take 1 tablet (20 mg total) by mouth 3 (three) times daily as needed (abdominal pain).    INSULIN LISPRO (HUMALOG KWIKPEN INSULIN) 100 UNIT/ML PEN insulin lispro (HUMALOG KWIKPEN INSULIN) 100 unit/mL pen       Inject every 4 hours if needed to correct a high blood sugar.    Inject every 4 hours if needed to correct a high blood sugar.    LANTUS SOLOSTAR U-100 INSULIN GLARGINE 100 UNITS/ML SUBQ PEN LANTUS SOLOSTAR U-100 INSULIN glargine 100 units/mL SubQ pen       Inject 40 Units into the skin once daily.    Inject 40 Units into the skin once daily.    METFORMIN (GLUCOPHAGE-XR) 500 MG ER 24HR TABLET metFORMIN (GLUCOPHAGE-XR) 500 MG ER 24hr tablet       Take 2 tablets (1,000 mg total) by mouth daily with breakfast.    Take 2 tablets (1,000 mg total) by mouth daily with breakfast.    SEMAGLUTIDE (OZEMPIC) 1 MG/DOSE (4 MG/3 ML) semaglutide (OZEMPIC) 1 mg/dose (4 mg/3 mL)       Inject 1 mg into the skin every 7 days.    Inject 1 mg into the skin every 7 days.    TRAZODONE (DESYREL) 50 MG TABLET traZODone (DESYREL) 50 MG tablet       Take 1 tablet (50 mg total) by mouth every evening.    Take 1 tablet (50 mg total) by mouth every evening.   Discontinued Medications    BLOOD-GLUCOSE SENSOR (DEXCOM G6 SENSOR) JAREK    Change sensor every 10 days    BLOOD-GLUCOSE TRANSMITTER (DEXCOM G6 TRANSMITTER) JAREK    Change sensor every 90 days    FLUCONAZOLE (DIFLUCAN) 150 MG TAB    Take 1 tablet by mouth today and can repeat dose in 3 days if still having symptoms.    FLUOXETINE 10 MG CAPSULE    Take 1 capsule by mouth once daily    GABAPENTIN (NEURONTIN) 300 MG CAPSULE    Take 1 capsule (300 mg total) by mouth 2 (two) times daily.

## 2023-11-16 ENCOUNTER — TELEPHONE (OUTPATIENT)
Dept: INTERNAL MEDICINE | Facility: CLINIC | Age: 48
End: 2023-11-16

## 2023-11-16 NOTE — TELEPHONE ENCOUNTER
----- Message from Dimitry Pritchett MD sent at 11/16/2023 12:54 PM CST -----  Needs appointment for lab review, establish care

## 2023-11-22 DIAGNOSIS — Z12.31 OTHER SCREENING MAMMOGRAM: ICD-10-CM

## 2023-11-27 ENCOUNTER — TELEPHONE (OUTPATIENT)
Dept: FAMILY MEDICINE | Facility: CLINIC | Age: 48
End: 2023-11-27
Payer: COMMERCIAL

## 2024-01-09 ENCOUNTER — PATIENT MESSAGE (OUTPATIENT)
Dept: OBSTETRICS AND GYNECOLOGY | Facility: CLINIC | Age: 49
End: 2024-01-09
Payer: COMMERCIAL

## 2024-01-24 ENCOUNTER — LAB VISIT (OUTPATIENT)
Dept: LAB | Facility: HOSPITAL | Age: 49
End: 2024-01-24
Attending: NURSE PRACTITIONER
Payer: COMMERCIAL

## 2024-01-24 ENCOUNTER — OFFICE VISIT (OUTPATIENT)
Dept: OBSTETRICS AND GYNECOLOGY | Facility: CLINIC | Age: 49
End: 2024-01-24
Payer: COMMERCIAL

## 2024-01-24 VITALS
SYSTOLIC BLOOD PRESSURE: 120 MMHG | WEIGHT: 210.31 LBS | BODY MASS INDEX: 31.87 KG/M2 | HEIGHT: 68 IN | DIASTOLIC BLOOD PRESSURE: 64 MMHG

## 2024-01-24 DIAGNOSIS — Z11.3 SCREEN FOR STD (SEXUALLY TRANSMITTED DISEASE): ICD-10-CM

## 2024-01-24 DIAGNOSIS — Z01.419 ROUTINE GYNECOLOGICAL EXAMINATION: Primary | ICD-10-CM

## 2024-01-24 DIAGNOSIS — Z79.4 UNCONTROLLED TYPE 2 DIABETES MELLITUS WITH HYPERGLYCEMIA, WITH LONG-TERM CURRENT USE OF INSULIN: ICD-10-CM

## 2024-01-24 DIAGNOSIS — N76.0 RECURRENT VAGINITIS: ICD-10-CM

## 2024-01-24 DIAGNOSIS — E11.65 UNCONTROLLED TYPE 2 DIABETES MELLITUS WITH HYPERGLYCEMIA, WITH LONG-TERM CURRENT USE OF INSULIN: ICD-10-CM

## 2024-01-24 DIAGNOSIS — Z12.39 ENCOUNTER FOR SCREENING FOR MALIGNANT NEOPLASM OF BREAST, UNSPECIFIED SCREENING MODALITY: ICD-10-CM

## 2024-01-24 PROCEDURE — 36415 COLL VENOUS BLD VENIPUNCTURE: CPT | Performed by: NURSE PRACTITIONER

## 2024-01-24 PROCEDURE — 1159F MED LIST DOCD IN RCRD: CPT | Mod: CPTII,S$GLB,, | Performed by: NURSE PRACTITIONER

## 2024-01-24 PROCEDURE — 3078F DIAST BP <80 MM HG: CPT | Mod: CPTII,S$GLB,, | Performed by: NURSE PRACTITIONER

## 2024-01-24 PROCEDURE — 1160F RVW MEDS BY RX/DR IN RCRD: CPT | Mod: CPTII,S$GLB,, | Performed by: NURSE PRACTITIONER

## 2024-01-24 PROCEDURE — 87491 CHLMYD TRACH DNA AMP PROBE: CPT | Performed by: NURSE PRACTITIONER

## 2024-01-24 PROCEDURE — 99396 PREV VISIT EST AGE 40-64: CPT | Mod: S$GLB,,, | Performed by: NURSE PRACTITIONER

## 2024-01-24 PROCEDURE — 80074 ACUTE HEPATITIS PANEL: CPT | Performed by: NURSE PRACTITIONER

## 2024-01-24 PROCEDURE — 87389 HIV-1 AG W/HIV-1&-2 AB AG IA: CPT | Performed by: NURSE PRACTITIONER

## 2024-01-24 PROCEDURE — 99999 PR PBB SHADOW E&M-EST. PATIENT-LVL IV: CPT | Mod: PBBFAC,,, | Performed by: NURSE PRACTITIONER

## 2024-01-24 PROCEDURE — 3074F SYST BP LT 130 MM HG: CPT | Mod: CPTII,S$GLB,, | Performed by: NURSE PRACTITIONER

## 2024-01-24 PROCEDURE — 3008F BODY MASS INDEX DOCD: CPT | Mod: CPTII,S$GLB,, | Performed by: NURSE PRACTITIONER

## 2024-01-24 PROCEDURE — 86592 SYPHILIS TEST NON-TREP QUAL: CPT | Performed by: NURSE PRACTITIONER

## 2024-01-24 RX ORDER — FLUCONAZOLE 150 MG/1
150 TABLET ORAL ONCE
Qty: 1 TABLET | Refills: 0 | Status: SHIPPED | OUTPATIENT
Start: 2024-01-24 | End: 2024-01-24 | Stop reason: SDUPTHER

## 2024-01-24 RX ORDER — FLUCONAZOLE 150 MG/1
TABLET ORAL
Qty: 4 TABLET | Refills: 11 | Status: SHIPPED | OUTPATIENT
Start: 2024-01-24

## 2024-01-24 NOTE — PROGRESS NOTES
"  Subjective:       Patient ID: Che Shay is a 48 y.o. female.    Chief Complaint:  Annual Exam      History of Present Illness  HPI  Complains of recurrent yeast infections with diabetes   Desires std screening   Health Maintenance   Topic Date Due    Low Dose Statin  Never done    Foot Exam  2022    Eye Exam  2022    Mammogram  2023    Hemoglobin A1c  02/15/2024    Lipid Panel  11/15/2024    Colorectal Cancer Screening  2030    TETANUS VACCINE  2031    Hepatitis C Screening  Completed     GYN & OB History  Patient's last menstrual period was 01/10/2024 (approximate).   Date of Last Pap: 2022    OB History    Para Term  AB Living   2 2 2     2   SAB IAB Ectopic Multiple Live Births                  # Outcome Date GA Lbr Eugene/2nd Weight Sex Delivery Anes PTL Lv   2 Term      CS-Unspec      1 Term      CS-Unspec          Review of Systems  Review of Systems        Objective:   /64   Ht 5' 8" (1.727 m)   Wt 95.4 kg (210 lb 5.1 oz)   LMP 01/10/2024 (Approximate)   BMI 31.98 kg/m²    Physical Exam   Declines   Assessment:        1. Routine gynecological examination    2. Encounter for screening for malignant neoplasm of breast, unspecified screening modality    3. Screen for STD (sexually transmitted disease)    4. Recurrent vaginitis    5. Uncontrolled type 2 diabetes mellitus with hyperglycemia, with long-term current use of insulin                Plan:            Che was seen today for annual exam.    Diagnoses and all orders for this visit:    Routine gynecological examination    Encounter for screening for malignant neoplasm of breast, unspecified screening modality    Screen for STD (sexually transmitted disease)  -     C. trachomatis/N. gonorrhoeae by AMP DNA [facility]; Urine  -     HIV 1/2 Ag/Ab (4th Gen); Future  -     RPR; Future  -     Hepatitis Panel, Acute; Future    Recurrent vaginitis  -     Discontinue: fluconazole (DIFLUCAN) 150 MG Tab; " Take 1 tablet (150 mg total) by mouth once. for 1 dose  -     fluconazole (DIFLUCAN) 150 MG Tab; Take one tablet weekly by mouth    Uncontrolled type 2 diabetes mellitus with hyperglycemia, with long-term current use of insulin    Return to clinic in one year for WWE   Explained the correlation of diabetes and yeast infections, verbalizes understanding no further questions

## 2024-01-25 LAB
HAV IGM SERPL QL IA: NORMAL
HBV CORE IGM SERPL QL IA: NORMAL
HBV SURFACE AG SERPL QL IA: NORMAL
HCV AB SERPL QL IA: NORMAL
HIV 1+2 AB+HIV1 P24 AG SERPL QL IA: NORMAL
RPR SER QL: NORMAL

## 2024-02-01 ENCOUNTER — TELEPHONE (OUTPATIENT)
Dept: OBSTETRICS AND GYNECOLOGY | Facility: CLINIC | Age: 49
End: 2024-02-01
Payer: COMMERCIAL

## 2024-02-01 NOTE — TELEPHONE ENCOUNTER
I called the pt and she didn't answer so I left her a voicemail. According to the lab due to weather delays and manufacturing allocation of the Chlamydia/Neisseria gonorrhoeae test reagent from [employer], the sample stability of this patient's urine sample has been exceeded. This test will be cancelled and credited. If the she would like to get retest she can let us know a day and time and we can get her scheduled for lab.

## 2024-02-08 ENCOUNTER — TELEPHONE (OUTPATIENT)
Dept: OBSTETRICS AND GYNECOLOGY | Facility: CLINIC | Age: 49
End: 2024-02-08
Payer: COMMERCIAL

## 2024-02-08 DIAGNOSIS — Z11.3 SCREEN FOR STD (SEXUALLY TRANSMITTED DISEASE): Primary | ICD-10-CM

## 2024-02-08 NOTE — TELEPHONE ENCOUNTER
----- Message from Betina Serra sent at 2/8/2024 11:12 AM CST -----  Contact: Che Summers states she can come for her urine screen on 2/13 with her mammo. Please call her back at [phone].    Thanks  TS

## 2024-02-13 ENCOUNTER — HOSPITAL ENCOUNTER (OUTPATIENT)
Dept: RADIOLOGY | Facility: HOSPITAL | Age: 49
Discharge: HOME | End: 2024-02-13
Attending: INTERNAL MEDICINE
Payer: COMMERCIAL

## 2024-02-13 VITALS — BODY MASS INDEX: 31.74 KG/M2 | HEIGHT: 68 IN | WEIGHT: 209.44 LBS

## 2024-02-13 DIAGNOSIS — Z12.31 OTHER SCREENING MAMMOGRAM: ICD-10-CM

## 2024-02-13 PROCEDURE — 77063 BREAST TOMOSYNTHESIS BI: CPT | Mod: 26,,, | Performed by: RADIOLOGY

## 2024-02-13 PROCEDURE — 77067 SCR MAMMO BI INCL CAD: CPT | Mod: TC

## 2024-02-13 PROCEDURE — 77067 SCR MAMMO BI INCL CAD: CPT | Mod: 26,,, | Performed by: RADIOLOGY

## 2024-04-19 ENCOUNTER — PATIENT MESSAGE (OUTPATIENT)
Dept: ADMINISTRATIVE | Facility: HOSPITAL | Age: 49
End: 2024-04-19
Payer: COMMERCIAL

## 2024-04-19 ENCOUNTER — PATIENT OUTREACH (OUTPATIENT)
Dept: ADMINISTRATIVE | Facility: HOSPITAL | Age: 49
End: 2024-04-19
Payer: COMMERCIAL

## 2024-04-19 NOTE — PROGRESS NOTES
VBHM Score: 4     Urine Screening  Eye Exam  Hemoglobin A1c  Foot Exam                       Health Maintenance Topic(s) Outreach Outcomes & Actions Taken:    Lab(s) - Outreach Outcomes & Actions Taken  : Overdue    Eye Exam - Outreach Outcomes & Actions Taken  : Overdue    Diabetic Foot Exam - Outreach Outcomes & Actions Taken  : Overdue           Additional Notes:  Attempted to contact pt no ans, lvm, sent portal letter               Care Management, Digital Medicine, and/or Education Referrals    OPCM Risk Score: 17.9         Next Steps - Referral Actions: No ans        Additional Notes:

## 2024-05-02 ENCOUNTER — OFFICE VISIT (OUTPATIENT)
Dept: INTERNAL MEDICINE | Facility: CLINIC | Age: 49
End: 2024-05-02
Payer: COMMERCIAL

## 2024-05-02 ENCOUNTER — TELEPHONE (OUTPATIENT)
Dept: INTERNAL MEDICINE | Facility: CLINIC | Age: 49
End: 2024-05-02
Payer: COMMERCIAL

## 2024-05-02 VITALS
HEIGHT: 68 IN | HEART RATE: 80 BPM | DIASTOLIC BLOOD PRESSURE: 68 MMHG | TEMPERATURE: 99 F | WEIGHT: 221.31 LBS | SYSTOLIC BLOOD PRESSURE: 148 MMHG | BODY MASS INDEX: 33.54 KG/M2 | OXYGEN SATURATION: 98 %

## 2024-05-02 DIAGNOSIS — E11.65 UNCONTROLLED TYPE 2 DIABETES MELLITUS WITH HYPERGLYCEMIA, WITH LONG-TERM CURRENT USE OF INSULIN: ICD-10-CM

## 2024-05-02 DIAGNOSIS — A04.8 H. PYLORI INFECTION: ICD-10-CM

## 2024-05-02 DIAGNOSIS — K58.0 IRRITABLE BOWEL SYNDROME WITH DIARRHEA: Chronic | ICD-10-CM

## 2024-05-02 DIAGNOSIS — D64.9 ANEMIA, UNSPECIFIED TYPE: Primary | ICD-10-CM

## 2024-05-02 DIAGNOSIS — Z79.4 UNCONTROLLED TYPE 2 DIABETES MELLITUS WITH HYPERGLYCEMIA, WITH LONG-TERM CURRENT USE OF INSULIN: ICD-10-CM

## 2024-05-02 DIAGNOSIS — E55.9 VITAMIN D DEFICIENCY: ICD-10-CM

## 2024-05-02 DIAGNOSIS — R14.0 ABDOMINAL BLOATING: ICD-10-CM

## 2024-05-02 PROCEDURE — 99999 PR PBB SHADOW E&M-EST. PATIENT-LVL V: CPT | Mod: PBBFAC,,, | Performed by: FAMILY MEDICINE

## 2024-05-02 PROCEDURE — 3077F SYST BP >= 140 MM HG: CPT | Mod: CPTII,S$GLB,, | Performed by: FAMILY MEDICINE

## 2024-05-02 PROCEDURE — 1159F MED LIST DOCD IN RCRD: CPT | Mod: CPTII,S$GLB,, | Performed by: FAMILY MEDICINE

## 2024-05-02 PROCEDURE — 99214 OFFICE O/P EST MOD 30 MIN: CPT | Mod: S$GLB,,, | Performed by: FAMILY MEDICINE

## 2024-05-02 PROCEDURE — 3078F DIAST BP <80 MM HG: CPT | Mod: CPTII,S$GLB,, | Performed by: FAMILY MEDICINE

## 2024-05-02 PROCEDURE — 3008F BODY MASS INDEX DOCD: CPT | Mod: CPTII,S$GLB,, | Performed by: FAMILY MEDICINE

## 2024-05-02 RX ORDER — PREGABALIN 300 MG/1
300 CAPSULE ORAL 2 TIMES DAILY
Qty: 60 CAPSULE | Refills: 0 | Status: SHIPPED | OUTPATIENT
Start: 2024-05-02

## 2024-05-02 RX ORDER — BLOOD-GLUCOSE SENSOR
EACH MISCELLANEOUS
Qty: 3 EACH | Refills: 11 | Status: SHIPPED | OUTPATIENT
Start: 2024-05-02

## 2024-05-02 RX ORDER — INSULIN GLARGINE 100 [IU]/ML
40 INJECTION, SOLUTION SUBCUTANEOUS DAILY
Qty: 15 ML | Refills: 0 | Status: SHIPPED | OUTPATIENT
Start: 2024-05-02

## 2024-05-02 RX ORDER — BLOOD-GLUCOSE TRANSMITTER
EACH MISCELLANEOUS
Qty: 1 EACH | Refills: 3 | Status: SHIPPED | OUTPATIENT
Start: 2024-05-02

## 2024-05-02 RX ORDER — TRAZODONE HYDROCHLORIDE 50 MG/1
50 TABLET ORAL NIGHTLY
Qty: 90 TABLET | Refills: 0 | Status: SHIPPED | OUTPATIENT
Start: 2024-05-02

## 2024-05-02 RX ORDER — METFORMIN HYDROCHLORIDE 500 MG/1
1000 TABLET, EXTENDED RELEASE ORAL
Qty: 180 TABLET | Refills: 0 | Status: SHIPPED | OUTPATIENT
Start: 2024-05-02

## 2024-05-02 RX ORDER — FLUOXETINE HYDROCHLORIDE 20 MG/1
20 CAPSULE ORAL DAILY
Qty: 90 CAPSULE | Refills: 0 | Status: SHIPPED | OUTPATIENT
Start: 2024-05-02 | End: 2025-05-02

## 2024-05-02 RX ORDER — INSULIN LISPRO 100 [IU]/ML
INJECTION, SOLUTION INTRAVENOUS; SUBCUTANEOUS
Qty: 15 ML | Refills: 0 | Status: SHIPPED | OUTPATIENT
Start: 2024-05-02

## 2024-05-02 RX ORDER — PANTOPRAZOLE SODIUM 40 MG/1
40 TABLET, DELAYED RELEASE ORAL 2 TIMES DAILY
Qty: 28 TABLET | Refills: 0 | Status: SHIPPED | OUTPATIENT
Start: 2024-05-02 | End: 2024-05-16

## 2024-05-03 ENCOUNTER — PATIENT MESSAGE (OUTPATIENT)
Dept: GASTROENTEROLOGY | Facility: CLINIC | Age: 49
End: 2024-05-03
Payer: COMMERCIAL

## 2024-05-04 NOTE — PROGRESS NOTES
Subjective:      Patient ID: Che Shay is a 48 y.o. female.    Chief Complaint: Medication Refill      Patient here for routine follow up and refills. She has been out of all medications for several months now. Increased stressors, going through divorce    Medication Refill  Associated symptoms include fatigue. Pertinent negatives include no abdominal pain or coughing.     Review of Systems   Constitutional:  Positive for fatigue. Negative for activity change and appetite change.   Respiratory:  Negative for cough and shortness of breath.    Cardiovascular:  Negative for leg swelling.   Gastrointestinal:  Negative for abdominal pain.     Past Medical History:   Diagnosis Date    Depression with anxiety     GERD (gastroesophageal reflux disease)     IBS (irritable bowel syndrome)     Type II or unspecified type diabetes mellitus without mention of complication, uncontrolled     Vitamin D deficiency           Past Surgical History:   Procedure Laterality Date    ABDOMINAL SURGERY      tummy tuck 10/2022    BREAST SURGERY  2000    Reduction     SECTION, LOW TRANSVERSE      x 2    CHOLECYSTECTOMY  2013    Right ankle surgery  2015    TOTAL REDUCTION MAMMOPLASTY Bilateral      Family History   Problem Relation Name Age of Onset    Diabetes Mother      Hypertension Mother      Glaucoma Father      Cancer Paternal Aunt      Stroke Paternal Aunt      Hypertension Paternal Aunt      Hypertension Paternal Uncle      Cancer Paternal Grandmother      Cancer Paternal Grandfather      No Known Problems Sister      No Known Problems Brother      No Known Problems Daughter      No Known Problems Daughter      No Known Problems Brother      Heart disease Neg Hx      Melanoma Neg Hx      Psoriasis Neg Hx      Lupus Neg Hx       Social History     Socioeconomic History    Marital status:     Number of children: 2   Occupational History    Occupation: LPN     Employer: [employer]  "    Comment: [employer]   Tobacco Use    Smoking status: Never    Smokeless tobacco: Never   Substance and Sexual Activity    Alcohol use: Yes     Alcohol/week: 0.0 standard drinks of alcohol     Comment: occassionally    Drug use: No    Sexual activity: Yes     Partners: Male     Birth control/protection: None   Other Topics Concern    Are you pregnant or think you may be? No    Breast-feeding No   Social History Narrative    . LPN. Wears seatbelt.     Review of patient's allergies indicates:  No Known Allergies    Objective:       BP (!) 148/68 (BP Location: Right arm, Patient Position: Sitting, BP Method: Large (Manual))   Pulse 80   Temp 98.6 °F (37 °C) (Tympanic)   Ht 5' 8" (1.727 m)   Wt 100.4 kg (221 lb 5.5 oz)   SpO2 98%   BMI 33.65 kg/m²   Physical Exam  Constitutional:       General: She is not in acute distress.     Appearance: Normal appearance. She is well-developed. She is not ill-appearing or diaphoretic.   Cardiovascular:      Rate and Rhythm: Normal rate and regular rhythm.      Heart sounds: Normal heart sounds.   Pulmonary:      Effort: Pulmonary effort is normal.      Breath sounds: Normal breath sounds.   Neurological:      Mental Status: She is alert and oriented to person, place, and time.   Psychiatric:         Mood and Affect: Mood normal.         Behavior: Behavior normal.         Thought Content: Thought content normal.         Judgment: Judgment normal.       Assessment:     1. Anemia, unspecified type    2. Uncontrolled type 2 diabetes mellitus with hyperglycemia, with long-term current use of insulin    3. Abdominal bloating    4. Irritable bowel syndrome with diarrhea    5. H. pylori infection    6. Vitamin D deficiency      Plan:   Anemia, unspecified type  -     CBC Auto Differential; Future; Expected date: 05/02/2024  -     IRON AND TIBC; Future; Expected date: 05/02/2024  -     Ferritin; Future; Expected date: 07/31/2024    Uncontrolled type 2 " diabetes mellitus with hyperglycemia, with long-term current use of insulin  -     Comprehensive Metabolic Panel; Future; Expected date: 05/02/2024  -     Hemoglobin A1C; Future; Expected date: 05/02/2024  -     CBC Auto Differential; Future; Expected date: 05/02/2024  -     Lipid Panel; Future; Expected date: 05/02/2024  -     TSH; Future; Expected date: 05/02/2024  -     IRON AND TIBC; Future; Expected date: 05/02/2024  -     Ferritin; Future; Expected date: 07/31/2024  -     metFORMIN (GLUCOPHAGE-XR) 500 MG ER 24hr tablet; Take 2 tablets (1,000 mg total) by mouth daily with breakfast.  Dispense: 180 tablet; Refill: 0  -     LANTUS SOLOSTAR U-100 INSULIN glargine 100 units/mL SubQ pen; Inject 40 Units into the skin once daily.  Dispense: 15 mL; Refill: 0  -     insulin lispro (HUMALOG KWIKPEN INSULIN) 100 unit/mL pen; Inject every 4 hours if needed to correct a high blood sugar.  Dispense: 15 mL; Refill: 0  -     Microalbumin/Creatinine Ratio, Urine    Abdominal bloating  -     Ambulatory referral/consult to Gastroenterology; Future; Expected date: 05/09/2024    Irritable bowel syndrome with diarrhea  -     Ambulatory referral/consult to Gastroenterology; Future; Expected date: 05/09/2024    H. pylori infection  -     pantoprazole (PROTONIX) 40 MG tablet; Take 1 tablet (40 mg total) by mouth 2 (two) times daily. for 14 days  Dispense: 28 tablet; Refill: 0    Vitamin D deficiency  -     Vitamin D; Future; Expected date: 05/02/2024  -     cholecalciferol, vitamin D3, (DECARA) 625 mcg (25,000 unit) Cap capsule; Take 1 capsule (25,000 Units total) by mouth once a week.  Dispense: 12 capsule; Refill: 3    Other orders  -     FLUoxetine 20 MG capsule; Take 1 capsule (20 mg total) by mouth once daily.  Dispense: 90 capsule; Refill: 0  -     traZODone (DESYREL) 50 MG tablet; Take 1 tablet (50 mg total) by mouth every evening.  Dispense: 90 tablet; Refill: 0  -     pregabalin (LYRICA) 300 MG Cap; Take 1 capsule (300 mg  "total) by mouth 2 (two) times daily.  Dispense: 60 capsule; Refill: 0  -     blood-glucose sensor (DEXCOM G6 SENSOR) Jarek; Use to monitor glucose continuosly  Dispense: 3 each; Refill: 11  -     blood-glucose transmitter (DEXCOM G6 TRANSMITTER) Jarek; Use to monitor glucose continuously  Dispense: 1 each; Refill: 3    Resume all meds  Needs above labs when fasting then follow up with me  Medication List with Changes/Refills   New Medications    BLOOD-GLUCOSE SENSOR (DEXCOM G6 SENSOR) JRAEK    Use to monitor glucose continuosly    BLOOD-GLUCOSE TRANSMITTER (DEXCOM G6 TRANSMITTER) JAREK    Use to monitor glucose continuously   Current Medications    BIMATOPROST (LATISSE) 0.03 % OPHTHALMIC SOLUTION    APPLY TO EYELID MARGINS ONCE DAILY AS NEEDED    BLOOD SUGAR DIAGNOSTIC STRP    Check blood sugar 3 times daily. Please dispense test strips that are approved on her plan.    BLOOD-GLUCOSE METER KIT    Please dispense the meter that is preferred on her plan. Use as instructed    CHLORHEXIDINE (PERIDEX) 0.12 % SOLUTION    15 mLs 2 (two) times daily.    DICYCLOMINE (BENTYL) 20 MG TABLET    Take 1 tablet (20 mg total) by mouth 3 (three) times daily as needed (abdominal pain).    FLUCONAZOLE (DIFLUCAN) 150 MG TAB    Take one tablet weekly by mouth    IPRATROPIUM (ATROVENT) 21 MCG (0.03 %) NASAL SPRAY    2 sprays by Each Nostril route 2 (two) times daily.    LANCETS MISC    Check blood sugar 3 times daily. Please dispense lancets that are approved on her insurance.    MULTIVIT-MINERALS/FOLIC ACID (ADULT MULTIVITAMIN GUMMIES ORAL)    Take by mouth as needed.    NYSTATIN (MYCOSTATIN) POWDER    Apply to affected area 3 times daily    PEN NEEDLE, DIABETIC (BD ULTRA-FINE CAMERON PEN NEEDLE) 32 GAUGE X 5/32" NDLE    Use with insulin 4-5 times daily    SEMAGLUTIDE (OZEMPIC) 1 MG/DOSE (4 MG/3 ML)    Inject 1 mg into the skin every 7 days.    SPIRONOLACTONE (ALDACTONE) 100 MG TABLET    Take 100 mg by mouth 2 (two) times daily.   Changed " and/or Refilled Medications    Modified Medication Previous Medication    CHOLECALCIFEROL, VITAMIN D3, (DECARA) 625 MCG (25,000 UNIT) CAP CAPSULE cholecalciferol, vitamin D3, (DECARA) 625 mcg (25,000 unit) Cap capsule       Take 1 capsule (25,000 Units total) by mouth once a week.    Take 1 capsule (25,000 Units total) by mouth once a week.    FLUOXETINE 20 MG CAPSULE FLUoxetine 20 MG capsule       Take 1 capsule (20 mg total) by mouth once daily.    Take 1 capsule (20 mg total) by mouth once daily.    INSULIN LISPRO (HUMALOG KWIKPEN INSULIN) 100 UNIT/ML PEN insulin lispro (HUMALOG KWIKPEN INSULIN) 100 unit/mL pen       Inject every 4 hours if needed to correct a high blood sugar.    Inject every 4 hours if needed to correct a high blood sugar.    LANTUS SOLOSTAR U-100 INSULIN GLARGINE 100 UNITS/ML SUBQ PEN LANTUS SOLOSTAR U-100 INSULIN glargine 100 units/mL SubQ pen       Inject 40 Units into the skin once daily.    Inject 40 Units into the skin once daily.    METFORMIN (GLUCOPHAGE-XR) 500 MG ER 24HR TABLET metFORMIN (GLUCOPHAGE-XR) 500 MG ER 24hr tablet       Take 2 tablets (1,000 mg total) by mouth daily with breakfast.    Take 2 tablets (1,000 mg total) by mouth daily with breakfast.    PANTOPRAZOLE (PROTONIX) 40 MG TABLET pantoprazole (PROTONIX) 40 MG tablet       Take 1 tablet (40 mg total) by mouth 2 (two) times daily. for 14 days    Take 1 tablet (40 mg total) by mouth 2 (two) times daily. for 14 days    PREGABALIN (LYRICA) 300 MG CAP pregabalin (LYRICA) 300 MG Cap       Take 1 capsule (300 mg total) by mouth 2 (two) times daily.    Take 1 capsule (300 mg total) by mouth 2 (two) times daily.    TRAZODONE (DESYREL) 50 MG TABLET traZODone (DESYREL) 50 MG tablet       Take 1 tablet (50 mg total) by mouth every evening.    Take 1 tablet (50 mg total) by mouth every evening.   Discontinued Medications    BENZONATATE (TESSALON) 200 MG CAPSULE    Take 1 capsule (200 mg total) by mouth 3 (three) times daily as  needed for Cough.

## 2024-05-06 ENCOUNTER — LAB VISIT (OUTPATIENT)
Dept: LAB | Facility: HOSPITAL | Age: 49
End: 2024-05-06
Attending: FAMILY MEDICINE
Payer: COMMERCIAL

## 2024-05-06 DIAGNOSIS — D64.9 ANEMIA, UNSPECIFIED TYPE: ICD-10-CM

## 2024-05-06 DIAGNOSIS — Z79.4 UNCONTROLLED TYPE 2 DIABETES MELLITUS WITH HYPERGLYCEMIA, WITH LONG-TERM CURRENT USE OF INSULIN: ICD-10-CM

## 2024-05-06 DIAGNOSIS — E55.9 VITAMIN D DEFICIENCY: ICD-10-CM

## 2024-05-06 DIAGNOSIS — E11.65 UNCONTROLLED TYPE 2 DIABETES MELLITUS WITH HYPERGLYCEMIA, WITH LONG-TERM CURRENT USE OF INSULIN: ICD-10-CM

## 2024-05-06 LAB
25(OH)D3+25(OH)D2 SERPL-MCNC: 18 NG/ML (ref 30–96)
ALBUMIN SERPL BCP-MCNC: 3.5 G/DL (ref 3.5–5.2)
ALP SERPL-CCNC: 98 U/L (ref 55–135)
ALT SERPL W/O P-5'-P-CCNC: 15 U/L (ref 10–44)
ANION GAP SERPL CALC-SCNC: 9 MMOL/L (ref 8–16)
AST SERPL-CCNC: 19 U/L (ref 10–40)
BASOPHILS # BLD AUTO: 0.02 K/UL (ref 0–0.2)
BASOPHILS NFR BLD: 0.4 % (ref 0–1.9)
BILIRUB SERPL-MCNC: 0.4 MG/DL (ref 0.1–1)
BUN SERPL-MCNC: 14 MG/DL (ref 6–20)
CALCIUM SERPL-MCNC: 9 MG/DL (ref 8.7–10.5)
CHLORIDE SERPL-SCNC: 106 MMOL/L (ref 95–110)
CHOLEST SERPL-MCNC: 154 MG/DL (ref 120–199)
CHOLEST/HDLC SERPL: 3.5 {RATIO} (ref 2–5)
CO2 SERPL-SCNC: 21 MMOL/L (ref 23–29)
CREAT SERPL-MCNC: 0.9 MG/DL (ref 0.5–1.4)
DIFFERENTIAL METHOD BLD: ABNORMAL
EOSINOPHIL # BLD AUTO: 0.2 K/UL (ref 0–0.5)
EOSINOPHIL NFR BLD: 3.4 % (ref 0–8)
ERYTHROCYTE [DISTWIDTH] IN BLOOD BY AUTOMATED COUNT: 17 % (ref 11.5–14.5)
EST. GFR  (NO RACE VARIABLE): >60 ML/MIN/1.73 M^2
ESTIMATED AVG GLUCOSE: 174 MG/DL (ref 68–131)
FERRITIN SERPL-MCNC: 9 NG/ML (ref 20–300)
GLUCOSE SERPL-MCNC: 312 MG/DL (ref 70–110)
HBA1C MFR BLD: 7.7 % (ref 4–5.6)
HCT VFR BLD AUTO: 28.1 % (ref 37–48.5)
HDLC SERPL-MCNC: 44 MG/DL (ref 40–75)
HDLC SERPL: 28.6 % (ref 20–50)
HGB BLD-MCNC: 8.6 G/DL (ref 12–16)
IMM GRANULOCYTES # BLD AUTO: 0.01 K/UL (ref 0–0.04)
IMM GRANULOCYTES NFR BLD AUTO: 0.2 % (ref 0–0.5)
IRON SERPL-MCNC: 29 UG/DL (ref 30–160)
LDLC SERPL CALC-MCNC: 91 MG/DL (ref 63–159)
LYMPHOCYTES # BLD AUTO: 1.7 K/UL (ref 1–4.8)
LYMPHOCYTES NFR BLD: 36.4 % (ref 18–48)
MCH RBC QN AUTO: 24.3 PG (ref 27–31)
MCHC RBC AUTO-ENTMCNC: 30.6 G/DL (ref 32–36)
MCV RBC AUTO: 79 FL (ref 82–98)
MONOCYTES # BLD AUTO: 0.5 K/UL (ref 0.3–1)
MONOCYTES NFR BLD: 11.2 % (ref 4–15)
NEUTROPHILS # BLD AUTO: 2.3 K/UL (ref 1.8–7.7)
NEUTROPHILS NFR BLD: 48.4 % (ref 38–73)
NONHDLC SERPL-MCNC: 110 MG/DL
NRBC BLD-RTO: 0 /100 WBC
PLATELET # BLD AUTO: 304 K/UL (ref 150–450)
PMV BLD AUTO: 11.6 FL (ref 9.2–12.9)
POTASSIUM SERPL-SCNC: 4.7 MMOL/L (ref 3.5–5.1)
PROT SERPL-MCNC: 6.4 G/DL (ref 6–8.4)
RBC # BLD AUTO: 3.54 M/UL (ref 4–5.4)
SATURATED IRON: 5 % (ref 20–50)
SODIUM SERPL-SCNC: 136 MMOL/L (ref 136–145)
TOTAL IRON BINDING CAPACITY: 611 UG/DL (ref 250–450)
TRANSFERRIN SERPL-MCNC: 413 MG/DL (ref 200–375)
TRIGL SERPL-MCNC: 95 MG/DL (ref 30–150)
TSH SERPL DL<=0.005 MIU/L-ACNC: 1.13 UIU/ML (ref 0.4–4)
WBC # BLD AUTO: 4.72 K/UL (ref 3.9–12.7)

## 2024-05-06 PROCEDURE — 82728 ASSAY OF FERRITIN: CPT | Performed by: FAMILY MEDICINE

## 2024-05-06 PROCEDURE — 83540 ASSAY OF IRON: CPT | Performed by: FAMILY MEDICINE

## 2024-05-06 PROCEDURE — 82306 VITAMIN D 25 HYDROXY: CPT | Performed by: FAMILY MEDICINE

## 2024-05-06 PROCEDURE — 84443 ASSAY THYROID STIM HORMONE: CPT | Performed by: FAMILY MEDICINE

## 2024-05-06 PROCEDURE — 85025 COMPLETE CBC W/AUTO DIFF WBC: CPT | Performed by: FAMILY MEDICINE

## 2024-05-06 PROCEDURE — 83036 HEMOGLOBIN GLYCOSYLATED A1C: CPT | Performed by: FAMILY MEDICINE

## 2024-05-06 PROCEDURE — 36415 COLL VENOUS BLD VENIPUNCTURE: CPT | Mod: PO | Performed by: FAMILY MEDICINE

## 2024-05-06 PROCEDURE — 80061 LIPID PANEL: CPT | Performed by: FAMILY MEDICINE

## 2024-05-06 PROCEDURE — 80053 COMPREHEN METABOLIC PANEL: CPT | Performed by: FAMILY MEDICINE

## 2024-05-07 ENCOUNTER — PATIENT MESSAGE (OUTPATIENT)
Dept: INTERNAL MEDICINE | Facility: CLINIC | Age: 49
End: 2024-05-07
Payer: COMMERCIAL

## 2024-05-07 ENCOUNTER — TELEPHONE (OUTPATIENT)
Dept: INTERNAL MEDICINE | Facility: CLINIC | Age: 49
End: 2024-05-07
Payer: COMMERCIAL

## 2024-05-07 DIAGNOSIS — D64.9 ANEMIA, UNSPECIFIED TYPE: Primary | ICD-10-CM

## 2024-05-07 NOTE — TELEPHONE ENCOUNTER
----- Message from Dimitry Pritchett MD sent at 5/7/2024  4:40 PM CDT -----  Notify her iron levels are all very low, needs to start taking over-the-counter iron supplement 2-3 times daily if tolerated.  Tell her to let me know if she is not able to get back on all her regular medications

## 2024-05-08 ENCOUNTER — TELEPHONE (OUTPATIENT)
Dept: INTERNAL MEDICINE | Facility: CLINIC | Age: 49
End: 2024-05-08

## 2024-05-09 ENCOUNTER — TELEPHONE (OUTPATIENT)
Dept: HEMATOLOGY/ONCOLOGY | Facility: CLINIC | Age: 49
End: 2024-05-09
Payer: COMMERCIAL

## 2024-05-13 ENCOUNTER — PATIENT OUTREACH (OUTPATIENT)
Dept: ADMINISTRATIVE | Facility: HOSPITAL | Age: 49
End: 2024-05-13
Payer: COMMERCIAL

## 2024-05-13 DIAGNOSIS — E11.65 UNCONTROLLED TYPE 2 DIABETES MELLITUS WITH HYPERGLYCEMIA, WITH LONG-TERM CURRENT USE OF INSULIN: Primary | ICD-10-CM

## 2024-05-13 DIAGNOSIS — Z79.4 UNCONTROLLED TYPE 2 DIABETES MELLITUS WITH HYPERGLYCEMIA, WITH LONG-TERM CURRENT USE OF INSULIN: Primary | ICD-10-CM

## 2024-05-13 NOTE — PROGRESS NOTES
VBHM Score: 3     Urine Screening  Eye Exam  Foot Exam                       Health Maintenance Topic(s) Outreach Outcomes & Actions Taken:    Lab(s) - Outreach Outcomes & Actions Taken  : Overdue microalbumin    Eye Exam - Outreach Outcomes & Actions Taken  : Overdue    Diabetic Foot Exam - Outreach Outcomes & Actions Taken  : Overdue         Additional Notes:  Attempted to contact pt no ans, lvm, pt completed all dm labs except microalbumin, pt has gastro appt 6.24.24 would like to schedule lab after that appt.               Care Management, Digital Medicine, and/or Education Referrals    OPCM Risk Score: 22         Next Steps - Referral Actions: No ans, lvm        \

## 2024-05-14 DIAGNOSIS — D64.9 ANEMIA: Primary | ICD-10-CM

## 2024-05-15 NOTE — PROGRESS NOTES
Pt returned call she is ok with doing microalbumin after Gastro appt 6.24.24, linked to lab appt already scheduled, pt declined scheduling DM eye exam at this time will call back.

## 2024-05-21 ENCOUNTER — PATIENT MESSAGE (OUTPATIENT)
Dept: ADMINISTRATIVE | Facility: HOSPITAL | Age: 49
End: 2024-05-21
Payer: COMMERCIAL

## 2024-05-30 ENCOUNTER — PATIENT MESSAGE (OUTPATIENT)
Dept: INTERNAL MEDICINE | Facility: CLINIC | Age: 49
End: 2024-05-30
Payer: COMMERCIAL

## 2024-11-11 ENCOUNTER — PATIENT OUTREACH (OUTPATIENT)
Dept: ADMINISTRATIVE | Facility: HOSPITAL | Age: 49
End: 2024-11-11
Payer: COMMERCIAL

## 2024-11-11 NOTE — LETTER
November 11, 2024      Dr Shan Pace,             [facility]  [address]  [place] [facility] [address]  Phone: [phone]   Patient: Che Shay   MR Number: [id number]   YOB: 1975            We are seeing Che Shay, SCOT.O.B is 1975, at [facility]. Dimitry Pritchett MD is their primary care physician. To help with our health maintenance records could you please send the following:     Most recent Colonoscopy with Path Report     Repeat Colonoscopy in  _____ years                                             Please send fax to [phone].    Thank-you in advance for your assistance. If you have any questions or concerns, please don't hesitate to contact me at [phone].     Sincerely,  Nandini SEVILLA LPN Care Coordination   [facility]   Phone [phone],  Fax [phone]

## 2024-11-11 NOTE — PROGRESS NOTES
[facility] Score: 4     Urine Screening  Eye Exam  Hemoglobin A1c  Foot Exam                 LM offering to schedule annual exam, follow up diabetes with labs. Asked when/where last eye exam completed.  Spoke with [facility] and they report last exam was in 2021.  Per HM pt had colonoscopy completed in 2020 by Dr Pace at [facility] END[facility].  Faxed request for copy of report.

## 2024-11-12 NOTE — PROGRESS NOTES
[facility], there are no records for this pt.  Re-faxed request for colon to Dr Pace office.

## 2024-11-15 NOTE — PROGRESS NOTES
Manually uploaded COLONOSCOPY 9/1/2020 to media. Colonoscopy needed to be repeated due to poor prep.

## 2025-05-12 ENCOUNTER — PATIENT OUTREACH (OUTPATIENT)
Dept: ADMINISTRATIVE | Facility: HOSPITAL | Age: 50
End: 2025-05-12
Payer: COMMERCIAL

## 2025-05-14 ENCOUNTER — PATIENT OUTREACH (OUTPATIENT)
Dept: ADMINISTRATIVE | Facility: HOSPITAL | Age: 50
End: 2025-05-14
Payer: COMMERCIAL

## 2025-05-14 NOTE — PROGRESS NOTES
VBHM Score: 7     Colon Cancer Screening  Urine Screening  Eye Exam  Hemoglobin A1c  Lipid Panel  Mammogram  Foot Exam                 Pt last seen May 2024,  LM offering to schedule annual exam, follow up diabetes with labs and mammo.

## 2025-06-18 ENCOUNTER — PATIENT OUTREACH (OUTPATIENT)
Dept: ADMINISTRATIVE | Facility: HOSPITAL | Age: 50
End: 2025-06-18
Payer: COMMERCIAL

## 2025-06-18 NOTE — LETTER
[id number]    AUTHORIZATION FOR RELEASE OF   CONFIDENTIAL INFORMATION    Dear Isa Benavidez NP,    We are seeing Che Shay, date of birth 1975, in the clinic at [facility]. Dimitry Pritchett MD is the patient's PCP. Che Shay has an outstanding lab/procedure at the time we reviewed her chart. In order to help keep her health information updated, she has authorized us to request the following medical record(s):            ( x ) MOST RECENT HEMOGLOBIN A1C RESULT           Please fax/email records to:  Fax #: [phone]  Email: [email or web address]     If you have any questions, please contact    IVETT Peck @ [phone]          Patient Name: Che Shay  : 1975  Patient Phone #: [phone]

## 2025-06-18 NOTE — PROGRESS NOTES
Working Ha1c Report:    RONALD sent to Isa Rodriguez. NP 1x to request hemoglobin A1c results. Reminder set.

## 2025-08-19 ENCOUNTER — PATIENT MESSAGE (OUTPATIENT)
Dept: ADMINISTRATIVE | Facility: HOSPITAL | Age: 50
End: 2025-08-19
Payer: COMMERCIAL